# Patient Record
Sex: FEMALE | Race: WHITE | NOT HISPANIC OR LATINO | Employment: OTHER | URBAN - METROPOLITAN AREA
[De-identification: names, ages, dates, MRNs, and addresses within clinical notes are randomized per-mention and may not be internally consistent; named-entity substitution may affect disease eponyms.]

---

## 2017-10-04 ENCOUNTER — TELEPHONE (OUTPATIENT)
Dept: FAMILY MEDICINE | Facility: CLINIC | Age: 37
End: 2017-10-04

## 2017-10-04 NOTE — TELEPHONE ENCOUNTER
"Stephania Barahona is a 37 year old female who calls with concerns of scabies. Telephone call with pt and significant other, lasting greater than 40 minutes.  States that s/o went to ER twice, once here and once at [facility], and was sent home with treatment for scabies with permetherin for household both times.  States that they left cream on overnight, and washed all sheets and clothes in hot water and high heat in the dryer, vacuumed everything furniture, shoes, cars etc.  States they thought that they had gotten rid of scabies, but then symptoms returned after a week.  They are reporting seeing mites and flesh colored worms that will come white heads and turn into sores.  States that there are 10 to 15 had bugs in one sore, and they will pull them out with tweezers.  States there is a lot of pus at first when popping white heads then they see the bugs.  States that they are about the size of a poppy seed, and they will feel a bite only to see a black spot left on the skin.  States they know they shouldn't be able to see larvae, but are seeing blood/flesh colored worms coming out of white heads as well.  Reports that red fiberous material appears by wounds overnight and she's been experiencing a wormy taste in his mouth.  Pt states that she notice \"horrendous\" abdominal pain with urgency to defecate in the beginning of September shortly after s/o's ER visit.  States this has occurred 2 times in the last week, and she is not having any pain currently.  States that she has been having bloody mucus in the middle of her stools.  States stools are not diarrhea, but are a mixture of loose and formed stools.  Denies any vomiting, fever, difficulty eating or any jaundicing.  States she has noticed some hesitancy and burning with urination.  Reports excessive itching and rashing on face, hands, underarms, butt and per pt \"everywhere.\"  States they have also been using tea tree oil.  Reports everyone in the household was " "treated after pt's ER visit and they have been cleaning \"to where we clean so much that it is hard to go to work.\"  States they are washing sheets daily, bathing animals, vacuuming and showering daily--put worn clothes into plastic bags right away, and have done everything they were advised to do.  They've bug bombed their house a few times also.  They are wondering if they can get a pill or something else for treatement.  Pt is also wondering if she can just bring a stool sample in.          NURSING ASSESSMENT:  Description:  Abdominal pain, scabies  Onset/duration:  About a month  Precip. factors:  S/O diagnosed with scabies at ER  Associated symptoms:  See above  Improves/worsens symptoms:  No improvement  Pain scale (0-10)   Severe when it occurs  LMP/preg/breast feeding:  NA  Last exam/Treatment:  9/1/2016  Allergies:   Allergies   Allergen Reactions     Clindamycin/Lincomycin Nausea     Nubain [Nalbuphine Hcl] Nausea and Vomiting     And dizziness     NURSING PLAN: Huddle with provider, plan includes see below    RECOMMENDED DISPOSITION: Huddled with Dafne Feliciano CNP.  States for ongoing issues with treatment and symptoms of seeing worms, GI symptoms and etc, pt and rest of house hold needs to be seen in clinic for separate appointments instead of home treatment, and not urgent to schedule, just next available appt.  Advised if truly resistant scabies, a dermatology referral might be needed.  Relayed message to pt.  Pt scheduled for appt tomorrow with Melani Reveles PA-C.  Advised pt that provider will determine if stool samples necessary, and that a specific container with preservatives is used for collection.  Pt asks about blood flukes, advised that I am unsure about this and can discuss at appt.  Appt made for 40 minute slot with issues instead of 20 minute.  Routing to provider as FYI.  Advised pt if worsening symptoms (severe stomach pain, inability to eat/drink, etc.) that she needs to go into ER.  " Pt verbalizes understanding and agrees to plan.    Will comply with recommendation: Yes  If further questions/concerns or if symptoms do not improve, worsen or new symptoms develop, call your PCP or [facility] as soon as possible.      Guideline used: Scabies  Telephone Triage Protocols for Nurses, Fifth Edition, Marilu Rivera  Scabies Clinical References    Ian Stephens RN

## 2017-10-05 NOTE — TELEPHONE ENCOUNTER
Patient called today and states that the appointment that was made for her today 10/05 with Melani Del Real she is unable to make due to her transportation running late. She is wondering if one the of providers working in [place] tomorrow 10/6 would be able to work her into their schedule. She states she is avaiable any time of day and did not want to wait until Monday if she didn't have to. She can be reached at [phone]. Please advise.     Thank you  Loy San  Patient Representative

## 2017-10-05 NOTE — TELEPHONE ENCOUNTER
I am not able to work her in Friday for this.  She should be scheduled in the next available regular slot (this is not an acute type visit).     Electronically signed by Britney Rodriguez CNP.

## 2017-10-06 ENCOUNTER — TELEPHONE (OUTPATIENT)
Dept: FAMILY MEDICINE | Facility: OTHER | Age: 37
End: 2017-10-06

## 2017-10-06 NOTE — TELEPHONE ENCOUNTER
Telephone call back from pt, states that she has now begun to feel thinks crawling in her temple area, she is seeing worms coming out of her pores, inside of her lips and her eyes are starting to look yellowish and goopy.  Pt states all the sudden she has a bunch of worms breaking through.  Pt states that she can not wait until Monday, advised pt to go to ER.  Pt verbalizes understanding and agrees to plan.    Ian Stephens RN, BSN

## 2017-10-06 NOTE — TELEPHONE ENCOUNTER
Patient returned call and scheduling did not feel comfortable relaying message below to patient. RN advised patient she should be scheduled in the next available regular slot. First available RN could offer was Monday. Patient does not know if she wants to wait until Monday. She said she will call to see if she can get in to [place] today. She will call back if she wants to schedule for Monday.     Maria Go RN  [facility]

## 2017-10-11 ENCOUNTER — RADIANT APPOINTMENT (OUTPATIENT)
Dept: GENERAL RADIOLOGY | Facility: OTHER | Age: 37
End: 2017-10-11
Attending: NURSE PRACTITIONER

## 2017-10-11 ENCOUNTER — OFFICE VISIT (OUTPATIENT)
Dept: FAMILY MEDICINE | Facility: OTHER | Age: 37
End: 2017-10-11

## 2017-10-11 VITALS
SYSTOLIC BLOOD PRESSURE: 108 MMHG | WEIGHT: 169 LBS | OXYGEN SATURATION: 100 % | DIASTOLIC BLOOD PRESSURE: 76 MMHG | RESPIRATION RATE: 20 BRPM | BODY MASS INDEX: 28.16 KG/M2 | HEART RATE: 125 BPM | TEMPERATURE: 97.7 F | HEIGHT: 65 IN

## 2017-10-11 DIAGNOSIS — R10.84 ABDOMINAL PAIN, GENERALIZED: ICD-10-CM

## 2017-10-11 DIAGNOSIS — R19.7 DIARRHEA, UNSPECIFIED TYPE: ICD-10-CM

## 2017-10-11 DIAGNOSIS — R19.5 ABNORMAL FECES: ICD-10-CM

## 2017-10-11 DIAGNOSIS — R10.84 ABDOMINAL PAIN, GENERALIZED: Primary | ICD-10-CM

## 2017-10-11 DIAGNOSIS — R53.83 FATIGUE, UNSPECIFIED TYPE: ICD-10-CM

## 2017-10-11 LAB
ANION GAP SERPL CALCULATED.3IONS-SCNC: 22 MMOL/L (ref 3–14)
BASOPHILS # BLD AUTO: 0 10E9/L (ref 0–0.2)
BASOPHILS NFR BLD AUTO: 0.2 %
BUN SERPL-MCNC: 11 MG/DL (ref 7–30)
CALCIUM SERPL-MCNC: 9.2 MG/DL (ref 8.5–10.1)
CHLORIDE SERPL-SCNC: 101 MMOL/L (ref 94–109)
CO2 SERPL-SCNC: 28 MMOL/L (ref 20–32)
CREAT SERPL-MCNC: 0.7 MG/DL (ref 0.52–1.04)
DIFFERENTIAL METHOD BLD: NORMAL
EOSINOPHIL # BLD AUTO: 0.1 10E9/L (ref 0–0.7)
EOSINOPHIL NFR BLD AUTO: 1.5 %
ERYTHROCYTE [DISTWIDTH] IN BLOOD BY AUTOMATED COUNT: 12.9 % (ref 10–15)
GFR SERPL CREATININE-BSD FRML MDRD: >90 ML/MIN/1.7M2
GLUCOSE SERPL-MCNC: 68 MG/DL (ref 70–99)
HCT VFR BLD AUTO: 43.6 % (ref 35–47)
HGB BLD-MCNC: 14.7 G/DL (ref 11.7–15.7)
LYMPHOCYTES # BLD AUTO: 2.5 10E9/L (ref 0.8–5.3)
LYMPHOCYTES NFR BLD AUTO: 31.4 %
MCH RBC QN AUTO: 29.6 PG (ref 26.5–33)
MCHC RBC AUTO-ENTMCNC: 33.7 G/DL (ref 31.5–36.5)
MCV RBC AUTO: 88 FL (ref 78–100)
MONOCYTES # BLD AUTO: 0.8 10E9/L (ref 0–1.3)
MONOCYTES NFR BLD AUTO: 9.7 %
NEUTROPHILS # BLD AUTO: 4.6 10E9/L (ref 1.6–8.3)
NEUTROPHILS NFR BLD AUTO: 57.2 %
PLATELET # BLD AUTO: 306 10E9/L (ref 150–450)
POTASSIUM SERPL-SCNC: 4 MMOL/L (ref 3.4–5.3)
RBC # BLD AUTO: 4.96 10E12/L (ref 3.8–5.2)
SODIUM SERPL-SCNC: 151 MMOL/L (ref 133–144)
TSH SERPL DL<=0.005 MIU/L-ACNC: 1.48 MU/L (ref 0.4–4)
WBC # BLD AUTO: 8.1 10E9/L (ref 4–11)

## 2017-10-11 PROCEDURE — 74000 XR ABDOMEN 1 VW: CPT

## 2017-10-11 PROCEDURE — 80048 BASIC METABOLIC PNL TOTAL CA: CPT | Performed by: NURSE PRACTITIONER

## 2017-10-11 PROCEDURE — 99214 OFFICE O/P EST MOD 30 MIN: CPT | Performed by: NURSE PRACTITIONER

## 2017-10-11 PROCEDURE — 36415 COLL VENOUS BLD VENIPUNCTURE: CPT | Performed by: NURSE PRACTITIONER

## 2017-10-11 PROCEDURE — 85025 COMPLETE CBC W/AUTO DIFF WBC: CPT | Performed by: NURSE PRACTITIONER

## 2017-10-11 PROCEDURE — 84443 ASSAY THYROID STIM HORMONE: CPT | Performed by: NURSE PRACTITIONER

## 2017-10-11 NOTE — NURSING NOTE
"Chief Complaint   Patient presents with     Abdominal Pain       Initial /76  Pulse 125  Temp 97.7  F (36.5  C) (Tympanic)  Resp 20  Ht 5' 4.5\" (1.638 m)  Wt 169 lb (76.7 kg)  LMP  (LMP Unknown)  SpO2 100%  Breastfeeding? No  BMI 28.56 kg/m2 Estimated body mass index is 28.56 kg/(m^2) as calculated from the following:    Height as of this encounter: 5' 4.5\" (1.638 m).    Weight as of this encounter: 169 lb (76.7 kg).  Medication Reconciliation: complete   ................Chase Cool LPN,   October 11, 2017,      3:01 PM,   [facility]    "

## 2017-10-11 NOTE — PATIENT INSTRUCTIONS
I did not see any abnormalities on your x-ray.  The radiologist will review it as well and they will call if they see anything I did not see.     Bring back the stool samples.     Labs will be done today.  I will call or send a letter with results within the next 1-2 weeks.

## 2017-10-11 NOTE — LETTER
October 12, 2017      Stephania Barahona  [address] [address] [place] [id number]        Dear ,    We are writing to inform you of your test results.    So far your labs are normal, except it appears you are slightly dehydrated.  You should increase your water intake and we will let you know when we get the stool sample results back.     Resulted Orders   CBC with platelets and differential   Result Value Ref Range    WBC 8.1 4.0 - 11.0 10e9/L    RBC Count 4.96 3.8 - 5.2 10e12/L    Hemoglobin 14.7 11.7 - 15.7 g/dL    Hematocrit 43.6 35.0 - 47.0 %    MCV 88 78 - 100 fl    MCH 29.6 26.5 - 33.0 pg    MCHC 33.7 31.5 - 36.5 g/dL    RDW 12.9 10.0 - 15.0 %    Platelet Count 306 150 - 450 10e9/L    Diff Method Automated Method     % Neutrophils 57.2 %    % Lymphocytes 31.4 %    % Monocytes 9.7 %    % Eosinophils 1.5 %    % Basophils 0.2 %    Absolute Neutrophil 4.6 1.6 - 8.3 10e9/L    Absolute Lymphocytes 2.5 0.8 - 5.3 10e9/L    Absolute Monocytes 0.8 0.0 - 1.3 10e9/L    Absolute Eosinophils 0.1 0.0 - 0.7 10e9/L    Absolute Basophils 0.0 0.0 - 0.2 10e9/L   Basic metabolic panel  (Ca, Cl, CO2, Creat, Gluc, K, Na, BUN)   Result Value Ref Range    Sodium 151 (H) 133 - 144 mmol/L    Potassium 4.0 3.4 - 5.3 mmol/L    Chloride 101 94 - 109 mmol/L    Carbon Dioxide 28 20 - 32 mmol/L    Anion Gap 22 (H) 3 - 14 mmol/L    Glucose 68 (L) 70 - 99 mg/dL    Urea Nitrogen 11 7 - 30 mg/dL    Creatinine 0.70 0.52 - 1.04 mg/dL    GFR Estimate >90 >60 mL/min/1.7m2      Comment:      Non  GFR Calc    GFR Estimate If Black >90 >60 mL/min/1.7m2      Comment:       GFR Calc    Calcium 9.2 8.5 - 10.1 mg/dL   TSH with free T4 reflex   Result Value Ref Range    TSH 1.48 0.40 - 4.00 mU/L       If you have any questions or concerns, please call the clinic at the number listed above.       Sincerely,    LINDA Young CNP

## 2017-10-11 NOTE — PROGRESS NOTES
"  SUBJECTIVE:   Stephania Barahona is a 37 year old female who presents to clinic today for the following health issues:      Abdominal pain      Duration: 6 weeks only when needing to have a BM    Description (location/character/radiation): extreme pain, blood tinged, color change    Intensity:  severe    Accompanying signs and symptoms: thinks she sees bugs in her skin, on water in toilet    History (similar episodes/previous evaluation):  dx with scabies    Precipitating or alleviating factors: None    Therapies tried and outcome: Permethrin cream for scabies - treated x 2.      Stephania has several concerns today including: intermittent abdominal pain x 6-8 weeks.  Intermittent constipation and diarrhea, fatigue and possible parasite infection.  She states she has only been having bowel movements every 1-2 weeks or so for the past 8 weeks.  When she does have them, they are diarrhea typically.  Stools were normal prior to 8 weeks ago.  She is also worried about possible worms or parasites in the stool as it appeared to be \"moving\" at one point.  She also reports blood and mucous in the stools.       Her  developed a rash about 2 months ago.  He was seen and treated for scabies.  Stephania also was treated at this time (she was not seen) and they cleaned their entire house.  Her husbands rash came back and they repeated treatment for the entire family.  Stephania did have a rash and itching then as well.  She states this is improved, but she has had several episodes in the past month where she thought \"bugs\" or \"worms\" may be coming out of her skin and lips (her  had similar symptoms).  She states that has not happened in the past 2 weeks.  This is the first time she is being seen for this.  Her  has been seen a total of 3 times and was diagnosed with scabies twice and no diagnosis once.  Stephania has not had any testing done.    She has also been very fatigued, to the point of sleeping most of the day.   No " fevers, has had chills.   No nausea or vomiting  No upper respiratory infection symptoms.       Problem list and histories reviewed & adjusted, as indicated.  Additional history: none    Patient Active Problem List   Diagnosis     Closed fracture of lumbar vertebra (H)     Degeneration of lumbar or lumbosacral intervertebral disc     Female infertility     CARDIOVASCULAR SCREENING; LDL GOAL LESS THAN 160     Hypertriglyceridemia     Past Surgical History:   Procedure Laterality Date     C NONSPECIFIC PROCEDURE  2007    lumbar fracture     D & C  03/25/11     TONSILLECTOMY  1991       Social History   Substance Use Topics     Smoking status: Current Every Day Smoker     Smokeless tobacco: Never Used      Comment: 2004     Alcohol use No      Comment: rarely     Family History   Problem Relation Age of Onset     Asthma Sister      DIABETES Sister      gestational diabetes     Asthma Sister      Asthma Brother      Psychotic Disorder Maternal Grandfather      Depression Maternal Grandfather      Alcohol/Drug Maternal Grandfather      Alzheimer Disease Paternal Grandfather      Dementia         Current Outpatient Prescriptions   Medication Sig Dispense Refill     cyclobenzaprine (FLEXERIL) 10 MG tablet TAKE 1/2 - 1 TABLET EVERY NIGHT AT BEDTIME 30 tablet 2     Allergies   Allergen Reactions     Clindamycin/Lincomycin Nausea     Nubain [Nalbuphine Hcl] Nausea and Vomiting     And dizziness     BP Readings from Last 3 Encounters:   10/11/17 108/76   09/02/16 100/68   09/14/15 102/80    Wt Readings from Last 3 Encounters:   10/11/17 169 lb (76.7 kg)   09/02/16 205 lb (93 kg)   09/14/15 179 lb 14.4 oz (81.6 kg)             Reviewed and updated as needed this visit by clinical staffTobacco  Allergies  Meds  Med Hx  Surg Hx  Fam Hx  Soc Hx      Reviewed and updated as needed this visit by Provider         ROS:  CONSTITUTIONAL:POSITIVE  for chills and fatigue and NEGATIVE  for fever   INTEGUMENTARY/SKIN: NEGATIVE for  "worrisome rashes, moles or lesions, as above  E/M: NEGATIVE for ear, mouth and throat problems  R: NEGATIVE for significant cough or SOB  CV: NEGATIVE for chest pain, palpitations or peripheral edema  GI: as above     OBJECTIVE:     /76  Pulse 125  Temp 97.7  F (36.5  C) (Tympanic)  Resp 20  Ht 5' 4.5\" (1.638 m)  Wt 169 lb (76.7 kg)  LMP  (LMP Unknown)  SpO2 100%  Breastfeeding? No  BMI 28.56 kg/m2  Body mass index is 28.56 kg/(m^2).  GENERAL: healthy, alert and no distress  NECK: no adenopathy, no asymmetry, masses, or scars and thyroid normal to palpation  RESP: lungs clear to auscultation - no rales, rhonchi or wheezes  CV: regular rate and rhythm, normal S1 S2, no S3 or S4, no murmur, click or rub, no peripheral edema and peripheral pulses strong  ABDOMEN: soft, nontender, no hepatosplenomegaly, no masses and bowel sounds normal  MS: no gross musculoskeletal defects noted, no edema  SKIN: no rash, she has many healing skin lesions on her arms and legs, no drainage     Diagnostic Test Results:  Labs pending    X-ray abdomen showed moderate stool, but no obstruction    ASSESSMENT/PLAN:         1. Abdominal pain, generalized  Will check some labs and stool samples  - CBC with platelets and differential  - Basic metabolic panel  (Ca, Cl, CO2, Creat, Gluc, K, Na, BUN)  - XR Abdomen 1 View; Future    2. Diarrhea, unspecified type  As above   - Enteric Bacteria and Virus Panel by PINKY Stool; Future  - Ova and Parasite Exam Routine; Future  - CBC with platelets and differential  - Basic metabolic panel  (Ca, Cl, CO2, Creat, Gluc, K, Na, BUN)  - Giardia antigen; Future  - Fecal colorectal cancer screen (FIT); Future  - Clostridium difficile Toxin B PCR; Future  - TSH with free T4 reflex    3. Abnormal feces  As above   - Enteric Bacteria and Virus Panel by PINKY Stool; Future  - Ova and Parasite Exam Routine; Future  - Giardia antigen; Future  - Fecal colorectal cancer screen (FIT); Future    4. Fatigue, " unspecified type  As above   - TSH with free T4 reflex    See Patient Instructions  Follow up will depend on results.     LINDA Young [facility]

## 2017-10-11 NOTE — MR AVS SNAPSHOT
After Visit Summary   10/11/2017    Stephania Barahona    MRN: [id number]           Patient Information     Date Of Birth          1980        Visit Information        Provider Department      10/11/2017 2:40 PM Britney Rodriguez APRN CNP [facility]        Today's Diagnoses     Abdominal pain, generalized    -  1    Diarrhea, unspecified type        Abnormal feces        Fatigue, unspecified type          Care Instructions    I did not see any abnormalities on your x-ray.  The radiologist will review it as well and they will call if they see anything I did not see.     Bring back the stool samples.     Labs will be done today.  I will call or send a letter with results within the next 1-2 weeks.              Follow-ups after your visit        Future tests that were ordered for you today     Open Future Orders        Priority Expected Expires Ordered    Enteric Bacteria and Virus Panel by PINKY Stool Routine  10/11/2018 10/11/2017    Ova and Parasite Exam Routine Routine  10/11/2018 10/11/2017    Giardia antigen Routine  10/11/2018 10/11/2017    Fecal colorectal cancer screen (FIT) Routine 11/1/2017 1/3/2018 10/11/2017    Clostridium difficile Toxin B PCR Routine  11/10/2017 10/11/2017            Who to contact     If you have questions or need follow up information about today's clinic visit or your schedule please contact [facility] directly at [phone].  Normal or non-critical lab and imaging results will be communicated to you by MyChart, letter or phone within 4 business days after the clinic has received the results. If you do not hear from us within 7 days, please contact the clinic through MyChart or phone. If you have a critical or abnormal lab result, we will notify you by phone as soon as possible.  Submit refill requests through [employer] or call your pharmacy and they will forward the refill request to us. Please allow 3 business days for your refill to be  "completed.          Additional Information About Your Visit        [employer] Information     [employer] lets you send messages to your doctor, view your test results, renew your prescriptions, schedule appointments and more. To sign up, go to [email or web address] . Click on \"Log in\" on the left side of the screen, which will take you to the Welcome page. Then click on \"Sign up Now\" on the right side of the page.     You will be asked to enter the access code listed below, as well as some personal information. Please follow the directions to create your username and password.     Your access code is: [email or web address]  Expires: 2018 12:28 PM     Your access code will  in 90 days. If you need help or a new code, please call your [facility] clinic or [phone].        Care EveryWhere ID     This is your Care EveryWhere ID. This could be used by other organizations to access your [facility] medical records  [id number]        Your Vitals Were     Pulse Temperature Respirations Height Last Period Pulse Oximetry    125 97.7  F (36.5  C) (Tympanic) 20 5' 4.5\" (1.638 m) (LMP Unknown) 100%    Breastfeeding? BMI (Body Mass Index)                No 28.56 kg/m2           Blood Pressure from Last 3 Encounters:   10/11/17 108/76   16 100/68   09/14/15 102/80    Weight from Last 3 Encounters:   10/11/17 169 lb (76.7 kg)   16 205 lb (93 kg)   09/14/15 179 lb 14.4 oz (81.6 kg)              We Performed the Following     Basic metabolic panel  (Ca, Cl, CO2, Creat, Gluc, K, Na, BUN)     CBC with platelets and differential     TSH with free T4 reflex        Primary Care Provider    None Specified       No primary provider on file.        Equal Access to Services     SAMPSON MORALES : Marc Wolf, hermann robles, sailaja dos santos. [facility] [phone].    ATENCIÓN: Si habla español, tiene a jamison disposición servicios gratuitos de asistencia lingüística. " Alis garcia [phone].    We comply with applicable federal civil rights laws and [place] laws. We do not discriminate on the basis of race, color, national origin, age, disability, sex, sexual orientation, or gender identity.            Thank you!     Thank you for choosing [facility]  for your care. Our goal is always to provide you with excellent care. Hearing back from our patients is one way we can continue to improve our services. Please take a few minutes to complete the written survey that you may receive in the mail after your visit with us. Thank you!             Your Updated Medication List - Protect others around you: Learn how to safely use, store and throw away your medicines at www.disposemymeds.org.          This list is accurate as of: 10/11/17  3:48 PM.  Always use your most recent med list.                   Brand Name Dispense Instructions for use Diagnosis    cyclobenzaprine 10 MG tablet    FLEXERIL    30 tablet    TAKE 1/2 - 1 TABLET EVERY NIGHT AT BEDTIME    Degeneration of lumbar or lumbosacral intervertebral disc

## 2017-10-12 DIAGNOSIS — R19.7 DIARRHEA, UNSPECIFIED TYPE: ICD-10-CM

## 2017-10-12 DIAGNOSIS — R19.5 ABNORMAL FECES: ICD-10-CM

## 2017-10-12 LAB
C DIFF TOX B STL QL: ABNORMAL
SPECIMEN SOURCE: ABNORMAL

## 2017-10-12 PROCEDURE — 87506 IADNA-DNA/RNA PROBE TQ 6-11: CPT | Performed by: NURSE PRACTITIONER

## 2017-10-12 PROCEDURE — 87493 C DIFF AMPLIFIED PROBE: CPT | Performed by: NURSE PRACTITIONER

## 2017-10-12 PROCEDURE — 87177 OVA AND PARASITES SMEARS: CPT | Performed by: NURSE PRACTITIONER

## 2017-10-12 PROCEDURE — 87329 GIARDIA AG IA: CPT | Performed by: NURSE PRACTITIONER

## 2017-10-12 PROCEDURE — 87209 SMEAR COMPLEX STAIN: CPT | Performed by: NURSE PRACTITIONER

## 2017-10-12 PROCEDURE — 82274 ASSAY TEST FOR BLOOD FECAL: CPT | Performed by: NURSE PRACTITIONER

## 2017-10-13 LAB
C COLI+JEJUNI+LARI FUSA STL QL NAA+PROBE: NOT DETECTED
EC STX1 GENE STL QL NAA+PROBE: NOT DETECTED
EC STX2 GENE STL QL NAA+PROBE: NOT DETECTED
ENTERIC PATHOGEN COMMENT: NORMAL
G LAMBLIA AG STL QL IA: NORMAL
NOROV GI+II ORF1-ORF2 JNC STL QL NAA+PR: NOT DETECTED
O+P STL MICRO: NORMAL
O+P STL MICRO: NORMAL
RVA NSP5 STL QL NAA+PROBE: NOT DETECTED
SALMONELLA SP RPOD STL QL NAA+PROBE: NOT DETECTED
SHIGELLA SP+EIEC IPAH STL QL NAA+PROBE: NOT DETECTED
SPECIMEN SOURCE: NORMAL
SPECIMEN SOURCE: NORMAL
V CHOL+PARA RFBL+TRKH+TNAA STL QL NAA+PR: NOT DETECTED
Y ENTERO RECN STL QL NAA+PROBE: NOT DETECTED

## 2017-10-15 LAB — HEMOCCULT STL QL IA: POSITIVE

## 2017-10-16 DIAGNOSIS — R19.5 ABNORMAL FECES: ICD-10-CM

## 2017-10-16 DIAGNOSIS — R19.7 DIARRHEA, UNSPECIFIED TYPE: ICD-10-CM

## 2017-10-16 DIAGNOSIS — R19.5 POSITIVE FIT (FECAL IMMUNOCHEMICAL TEST): Primary | ICD-10-CM

## 2017-10-18 ENCOUNTER — TELEPHONE (OUTPATIENT)
Dept: FAMILY MEDICINE | Facility: OTHER | Age: 37
End: 2017-10-18

## 2017-10-18 NOTE — TELEPHONE ENCOUNTER
Called to schedule colonoscopy. Patient states she does not want this procedure at this time.  Informed her to call us if she changes her mind.

## 2017-10-18 NOTE — TELEPHONE ENCOUNTER
Reason for Call:  Request for results:    Name of test or procedure: FIT    Date of test of procedure: last week    Location of the test or procedure: [facility] to leave the result message on voice mail or with a family member? Please call  at [phone] to give results.  Permission given verbally by patient.     Phone number Patient can be reached at:  Home number on file [phone] (home)    Additional comments: please call.  She states daughter is now having similar symptoms.    Call taken on 10/18/2017 at 7:26 AM by Christopher Macdonald

## 2017-10-18 NOTE — TELEPHONE ENCOUNTER
I called pt, left detailed msg with instructions to set up a colonoscopy per lab results from Britney Rodriguez APRN CNP as follows:    Notes Recorded by Britney Rodriguez APRN CNP on 10/16/2017 at 7:03 AM  Please call and advise patient that her FIT test was positive for blood in her stool.  She will need to have a colonoscopy to evaluate where the blood is coming from.  Order placed, please assist in scheduling.     ................Chase Cool LPN,   October 18, 2017,      11:57 AM,   [facility]

## 2017-10-18 NOTE — PROGRESS NOTES
Called pt, left msg informing of results/recommendations.  ................Chase Cool LPN,   October 18, 2017,      12:06 PM,   [facility]

## 2017-11-09 ENCOUNTER — HOSPITAL ENCOUNTER (EMERGENCY)
Facility: CLINIC | Age: 37
Discharge: HOME | End: 2017-11-09
Attending: PHYSICIAN ASSISTANT | Admitting: PHYSICIAN ASSISTANT

## 2017-11-09 ENCOUNTER — APPOINTMENT (OUTPATIENT)
Dept: CT IMAGING | Facility: CLINIC | Age: 37
End: 2017-11-09
Attending: PHYSICIAN ASSISTANT

## 2017-11-09 ENCOUNTER — APPOINTMENT (OUTPATIENT)
Dept: ULTRASOUND IMAGING | Facility: CLINIC | Age: 37
End: 2017-11-09
Attending: PHYSICIAN ASSISTANT

## 2017-11-09 VITALS
BODY MASS INDEX: 27.88 KG/M2 | TEMPERATURE: 98.1 F | HEART RATE: 90 BPM | DIASTOLIC BLOOD PRESSURE: 69 MMHG | WEIGHT: 165 LBS | RESPIRATION RATE: 16 BRPM | SYSTOLIC BLOOD PRESSURE: 105 MMHG | OXYGEN SATURATION: 100 %

## 2017-11-09 DIAGNOSIS — K92.1 BLOOD IN STOOL: ICD-10-CM

## 2017-11-09 DIAGNOSIS — N83.201 CYSTS OF BOTH OVARIES: ICD-10-CM

## 2017-11-09 DIAGNOSIS — N83.202 CYSTS OF BOTH OVARIES: ICD-10-CM

## 2017-11-09 DIAGNOSIS — R10.30 LOWER ABDOMINAL PAIN: ICD-10-CM

## 2017-11-09 LAB
ABO + RH BLD: NORMAL
ABO + RH BLD: NORMAL
ALBUMIN SERPL-MCNC: 3.8 G/DL (ref 3.4–5)
ALBUMIN UR-MCNC: NEGATIVE MG/DL
ALP SERPL-CCNC: 80 U/L (ref 40–150)
ALT SERPL W P-5'-P-CCNC: 21 U/L (ref 0–50)
AMORPH CRY #/AREA URNS HPF: ABNORMAL /HPF
ANION GAP SERPL CALCULATED.3IONS-SCNC: 4 MMOL/L (ref 3–14)
APPEARANCE UR: ABNORMAL
AST SERPL W P-5'-P-CCNC: 19 U/L (ref 0–45)
BACTERIA #/AREA URNS HPF: ABNORMAL /HPF
BASOPHILS # BLD AUTO: 0.1 10E9/L (ref 0–0.2)
BASOPHILS NFR BLD AUTO: 0.7 %
BILIRUB SERPL-MCNC: 0.1 MG/DL (ref 0.2–1.3)
BILIRUB UR QL STRIP: NEGATIVE
BLD GP AB SCN SERPL QL: NORMAL
BLOOD BANK CMNT PATIENT-IMP: NORMAL
BUN SERPL-MCNC: 10 MG/DL (ref 7–30)
C DIFF TOX B STL QL: ABNORMAL
CALCIUM SERPL-MCNC: 8.6 MG/DL (ref 8.5–10.1)
CHLORIDE SERPL-SCNC: 105 MMOL/L (ref 94–109)
CO2 SERPL-SCNC: 31 MMOL/L (ref 20–32)
COLOR UR AUTO: YELLOW
CREAT SERPL-MCNC: 0.72 MG/DL (ref 0.52–1.04)
CRP SERPL-MCNC: <2.9 MG/L (ref 0–8)
DIFFERENTIAL METHOD BLD: NORMAL
EOSINOPHIL # BLD AUTO: 0.1 10E9/L (ref 0–0.7)
EOSINOPHIL NFR BLD AUTO: 1.5 %
ERYTHROCYTE [DISTWIDTH] IN BLOOD BY AUTOMATED COUNT: 13 % (ref 10–15)
GFR SERPL CREATININE-BSD FRML MDRD: >90 ML/MIN/1.7M2
GLUCOSE SERPL-MCNC: 115 MG/DL (ref 70–99)
GLUCOSE UR STRIP-MCNC: NEGATIVE MG/DL
HCG UR QL: NEGATIVE
HCT VFR BLD AUTO: 42.9 % (ref 35–47)
HEMOCCULT STL QL: POSITIVE
HGB BLD-MCNC: 14.3 G/DL (ref 11.7–15.7)
HGB UR QL STRIP: NEGATIVE
IMM GRANULOCYTES # BLD: 0 10E9/L (ref 0–0.4)
IMM GRANULOCYTES NFR BLD: 0.2 %
KETONES UR STRIP-MCNC: NEGATIVE MG/DL
LACTATE BLD-SCNC: 1.1 MMOL/L (ref 0.7–2)
LEUKOCYTE ESTERASE UR QL STRIP: ABNORMAL
LIPASE SERPL-CCNC: 361 U/L (ref 73–393)
LYMPHOCYTES # BLD AUTO: 2.4 10E9/L (ref 0.8–5.3)
LYMPHOCYTES NFR BLD AUTO: 27.6 %
MCH RBC QN AUTO: 29.7 PG (ref 26.5–33)
MCHC RBC AUTO-ENTMCNC: 33.3 G/DL (ref 31.5–36.5)
MCV RBC AUTO: 89 FL (ref 78–100)
MONOCYTES # BLD AUTO: 0.6 10E9/L (ref 0–1.3)
MONOCYTES NFR BLD AUTO: 6.4 %
MUCOUS THREADS #/AREA URNS LPF: PRESENT /LPF
NEUTROPHILS # BLD AUTO: 5.5 10E9/L (ref 1.6–8.3)
NEUTROPHILS NFR BLD AUTO: 63.6 %
NITRATE UR QL: NEGATIVE
PH UR STRIP: 8 PH (ref 5–7)
PLATELET # BLD AUTO: 305 10E9/L (ref 150–450)
POTASSIUM SERPL-SCNC: 3.6 MMOL/L (ref 3.4–5.3)
PROT SERPL-MCNC: 7.1 G/DL (ref 6.8–8.8)
RBC # BLD AUTO: 4.81 10E12/L (ref 3.8–5.2)
RBC #/AREA URNS AUTO: 0 /HPF (ref 0–2)
SODIUM SERPL-SCNC: 140 MMOL/L (ref 133–144)
SOURCE: ABNORMAL
SP GR UR STRIP: 1.01 (ref 1–1.03)
SPECIMEN EXP DATE BLD: NORMAL
SPECIMEN SOURCE: ABNORMAL
SQUAMOUS #/AREA URNS AUTO: 27 /HPF (ref 0–1)
UROBILINOGEN UR STRIP-MCNC: 0 MG/DL (ref 0–2)
WBC # BLD AUTO: 8.7 10E9/L (ref 4–11)
WBC #/AREA URNS AUTO: 1 /HPF (ref 0–2)

## 2017-11-09 PROCEDURE — 25000125 ZZHC RX 250: Performed by: PHYSICIAN ASSISTANT

## 2017-11-09 PROCEDURE — 81001 URINALYSIS AUTO W/SCOPE: CPT | Performed by: PHYSICIAN ASSISTANT

## 2017-11-09 PROCEDURE — 99285 EMERGENCY DEPT VISIT HI MDM: CPT | Mod: 25 | Performed by: PHYSICIAN ASSISTANT

## 2017-11-09 PROCEDURE — 86850 RBC ANTIBODY SCREEN: CPT | Performed by: PHYSICIAN ASSISTANT

## 2017-11-09 PROCEDURE — 25000128 H RX IP 250 OP 636: Performed by: PHYSICIAN ASSISTANT

## 2017-11-09 PROCEDURE — 74177 CT ABD & PELVIS W/CONTRAST: CPT

## 2017-11-09 PROCEDURE — 86901 BLOOD TYPING SEROLOGIC RH(D): CPT | Performed by: PHYSICIAN ASSISTANT

## 2017-11-09 PROCEDURE — 86140 C-REACTIVE PROTEIN: CPT | Performed by: PHYSICIAN ASSISTANT

## 2017-11-09 PROCEDURE — 83690 ASSAY OF LIPASE: CPT | Performed by: PHYSICIAN ASSISTANT

## 2017-11-09 PROCEDURE — 96360 HYDRATION IV INFUSION INIT: CPT | Performed by: PHYSICIAN ASSISTANT

## 2017-11-09 PROCEDURE — 99285 EMERGENCY DEPT VISIT HI MDM: CPT | Mod: Z6 | Performed by: PHYSICIAN ASSISTANT

## 2017-11-09 PROCEDURE — 83605 ASSAY OF LACTIC ACID: CPT | Performed by: PHYSICIAN ASSISTANT

## 2017-11-09 PROCEDURE — 82272 OCCULT BLD FECES 1-3 TESTS: CPT | Performed by: PHYSICIAN ASSISTANT

## 2017-11-09 PROCEDURE — 93976 VASCULAR STUDY: CPT | Mod: XS

## 2017-11-09 PROCEDURE — 86900 BLOOD TYPING SEROLOGIC ABO: CPT | Performed by: PHYSICIAN ASSISTANT

## 2017-11-09 PROCEDURE — 81025 URINE PREGNANCY TEST: CPT | Performed by: PHYSICIAN ASSISTANT

## 2017-11-09 PROCEDURE — 80053 COMPREHEN METABOLIC PANEL: CPT | Performed by: PHYSICIAN ASSISTANT

## 2017-11-09 PROCEDURE — 87506 IADNA-DNA/RNA PROBE TQ 6-11: CPT | Performed by: PHYSICIAN ASSISTANT

## 2017-11-09 PROCEDURE — 87086 URINE CULTURE/COLONY COUNT: CPT | Performed by: PHYSICIAN ASSISTANT

## 2017-11-09 PROCEDURE — 85025 COMPLETE CBC W/AUTO DIFF WBC: CPT | Performed by: PHYSICIAN ASSISTANT

## 2017-11-09 RX ORDER — HYDROCODONE BITARTRATE AND ACETAMINOPHEN 5; 325 MG/1; MG/1
1 TABLET ORAL EVERY 6 HOURS PRN
Qty: 6 TABLET | Refills: 0 | Status: SHIPPED | OUTPATIENT
Start: 2017-11-09 | End: 2018-07-12

## 2017-11-09 RX ORDER — IOPAMIDOL 755 MG/ML
500 INJECTION, SOLUTION INTRAVASCULAR ONCE
Status: COMPLETED | OUTPATIENT
Start: 2017-11-09 | End: 2017-11-09

## 2017-11-09 RX ORDER — SODIUM CHLORIDE 9 MG/ML
1000 INJECTION, SOLUTION INTRAVENOUS CONTINUOUS
Status: DISCONTINUED | OUTPATIENT
Start: 2017-11-09 | End: 2017-11-09 | Stop reason: HOSPADM

## 2017-11-09 RX ADMIN — SODIUM CHLORIDE 60 ML: 9 INJECTION, SOLUTION INTRAVENOUS at 17:35

## 2017-11-09 RX ADMIN — IOPAMIDOL 81 ML: 755 INJECTION, SOLUTION INTRAVENOUS at 17:35

## 2017-11-09 RX ADMIN — SODIUM CHLORIDE 1000 ML: 9 INJECTION, SOLUTION INTRAVENOUS at 17:02

## 2017-11-09 ASSESSMENT — ENCOUNTER SYMPTOMS
BLOOD IN STOOL: 1
NAUSEA: 0
ABDOMINAL PAIN: 1
RECTAL PAIN: 1
VOMITING: 0
DIARRHEA: 0
WEAKNESS: 1
FEVER: 0

## 2017-11-09 NOTE — ED NOTES
Pt c/o abd pain and bloody stool x 2-3 hours.  States she has had bloody stool and was told to get a colonoscopy,  symptoms resolved and she didn't get one.

## 2017-11-09 NOTE — ED AVS SNAPSHOT
[facility] Emergency Department    [address] [place] [address]    Phone:  [phone]    Fax:  [phone]                                       Stephania Barahona   MRN: [id number]    Department:  [facility] Emergency Department   Date of Visit:  11/9/2017           After Visit Summary Signature Page     I have received my discharge instructions, and my questions have been answered. I have discussed any challenges I see with this plan with the nurse or doctor.    ..........................................................................................................................................  Patient/Patient Representative Signature      ..........................................................................................................................................  Patient Representative Print Name and Relationship to Patient    ..................................................               ................................................  Date                                            Time    ..........................................................................................................................................  Reviewed by Signature/Title    ...................................................              ..............................................  Date                                                            Time

## 2017-11-09 NOTE — ED PROVIDER NOTES
"  History     Chief Complaint   Patient presents with     Abdominal Pain     Rectal Bleeding     The history is provided by the patient.     Stephania Barahona is a 37 year old female who presents to the ED complaining of abdominal pain and hematochezia. The patient was initially seen in the clinic on 10/11 complaining of abdominal pain with bowel movements, infrequent bowel movements, and blood in her stool for 8 weeks. An abdominal X-ray was done which showed a moderate amount of stool without obstruction. Labs were collected at that time including CBC, BMP, and TSH which were WNL. Stool collected. Ova and Parasite Screen, Giardia, C. Diff, and Enteric Bacteria panel were negative. FIT test was positive for blood. The patient was notified and told to set up a colonoscopy but she declined. Today, she presents to the ED complaining of RUQ and generalized lower abdominal pain that is intermittent, with associated rectal and vaginal pain. She awoke with it this morning but then it resolved. About 2 hours later it reoccurred, accompanied by 2-3 episodes of hematochezia. She describes this as \"blood streaked stool\". She rates her pain 5-6/10 and reports that it is increasingly painful to sit. She also complains of weakness, but thinks this may be due to a recent URI. Patient denies any fever, nausea, vomiting, diarrhea, or other associated symptoms. No vaginal discharge or bleeding.      Problem List:    Patient Active Problem List    Diagnosis Date Noted     Hypertriglyceridemia 01/27/2015     Priority: Medium     CARDIOVASCULAR SCREENING; LDL GOAL LESS THAN 160 10/31/2010     Priority: Medium     Female infertility 09/30/2009     Priority: Medium     Problem list name updated by automated process. Provider to review       Closed fracture of lumbar vertebra (H) 12/24/2007     Priority: Medium     Problem list name updated by automated process. Provider to review       Degeneration of lumbar or lumbosacral intervertebral " disc 12/24/2007     Priority: Medium        Past Medical History:    Past Medical History:   Diagnosis Date     NO ACTIVE PROBLEMS        Past Surgical History:    Past Surgical History:   Procedure Laterality Date     C NONSPECIFIC PROCEDURE  2007    lumbar fracture     D & C  03/25/11     TONSILLECTOMY  1991       Family History:    Family History   Problem Relation Age of Onset     Asthma Sister      DIABETES Sister      gestational diabetes     Asthma Sister      Asthma Brother      Psychotic Disorder Maternal Grandfather      Depression Maternal Grandfather      Alcohol/Drug Maternal Grandfather      Alzheimer Disease Paternal Grandfather      Dementia       Social History:  Marital Status:   [2]  Social History   Substance Use Topics     Smoking status: Current Every Day Smoker     Smokeless tobacco: Never Used      Comment: 2004     Alcohol use No      Comment: rarely        Medications:      HYDROcodone-acetaminophen (NORCO) 5-325 MG per tablet   cyclobenzaprine (FLEXERIL) 10 MG tablet         Review of Systems   Constitutional: Negative for fever.   Gastrointestinal: Positive for abdominal pain (RUQ and lower abdomen), blood in stool (bright red streaks) and rectal pain. Negative for diarrhea, nausea and vomiting.   Neurological: Positive for weakness.   All other systems reviewed and are negative.      Physical Exam   BP: 120/80  Pulse: 108  Heart Rate: 90  Temp: 96.7  F (35.9  C)  Resp: 16  Weight: 74.8 kg (165 lb)  SpO2: 98 %      Physical Exam   Constitutional: She is oriented to person, place, and time. No distress.   HENT:   Head: Normocephalic and atraumatic.   Mouth/Throat: Oropharynx is clear and moist.   Eyes: Conjunctivae and EOM are normal.   Neck: Normal range of motion. Neck supple.   Cardiovascular: Normal rate, regular rhythm, normal heart sounds and intact distal pulses.    No murmur heard.  Pulmonary/Chest: Effort normal and breath sounds normal. No respiratory distress. She has no  wheezes. She has no rales. She exhibits no tenderness.   Abdominal: Normal appearance and bowel sounds are normal. She exhibits no distension. There is tenderness in the right lower quadrant, suprapubic area and left lower quadrant. There is no rigidity, no rebound and no guarding.       Musculoskeletal: Normal range of motion.   Neurological: She is alert and oriented to person, place, and time.   Skin: Skin is warm and dry. No rash noted. She is not diaphoretic. No pallor.   Psychiatric: She has a normal mood and affect. Her behavior is normal.   Nursing note and vitals reviewed.      ED Course     ED Course     Procedures       Critical Care time:  none     Results for orders placed or performed during the hospital encounter of 11/09/17 (from the past 24 hour(s))   UA with Microscopic reflex to Culture   Result Value Ref Range    Color Urine Yellow     Appearance Urine Cloudy     Glucose Urine Negative NEG^Negative mg/dL    Bilirubin Urine Negative NEG^Negative    Ketones Urine Negative NEG^Negative mg/dL    Specific Gravity Urine 1.014 1.003 - 1.035    Blood Urine Negative NEG^Negative    pH Urine 8.0 (H) 5.0 - 7.0 pH    Protein Albumin Urine Negative NEG^Negative mg/dL    Urobilinogen mg/dL 0.0 0.0 - 2.0 mg/dL    Nitrite Urine Negative NEG^Negative    Leukocyte Esterase Urine Large (A) NEG^Negative    Source Unspecified Urine     WBC Urine 1 0 - 2 /HPF    RBC Urine 0 0 - 2 /HPF    Bacteria Urine Few (A) NEG^Negative /HPF    Squamous Epithelial /HPF Urine 27 (H) 0 - 1 /HPF    Mucous Urine Present (A) NEG^Negative /LPF    Amorphous Crystals Few (A) NEG^Negative /HPF   HCG qualitative urine   Result Value Ref Range    HCG Qual Urine Negative NEG^Negative   Occult blood stool   Result Value Ref Range    Occult Blood Positive (A) NEG^Negative   Clostridium difficile toxin B PCR   Result Value Ref Range    Specimen Description Feces     C Diff Toxin B PCR Canceled, Test credited (A) NEG^Negative   CBC with platelets  differential   Result Value Ref Range    WBC 8.7 4.0 - 11.0 10e9/L    RBC Count 4.81 3.8 - 5.2 10e12/L    Hemoglobin 14.3 11.7 - 15.7 g/dL    Hematocrit 42.9 35.0 - 47.0 %    MCV 89 78 - 100 fl    MCH 29.7 26.5 - 33.0 pg    MCHC 33.3 31.5 - 36.5 g/dL    RDW 13.0 10.0 - 15.0 %    Platelet Count 305 150 - 450 10e9/L    Diff Method Automated Method     % Neutrophils 63.6 %    % Lymphocytes 27.6 %    % Monocytes 6.4 %    % Eosinophils 1.5 %    % Basophils 0.7 %    % Immature Granulocytes 0.2 %    Absolute Neutrophil 5.5 1.6 - 8.3 10e9/L    Absolute Lymphocytes 2.4 0.8 - 5.3 10e9/L    Absolute Monocytes 0.6 0.0 - 1.3 10e9/L    Absolute Eosinophils 0.1 0.0 - 0.7 10e9/L    Absolute Basophils 0.1 0.0 - 0.2 10e9/L    Abs Immature Granulocytes 0.0 0 - 0.4 10e9/L   Comprehensive metabolic panel   Result Value Ref Range    Sodium 140 133 - 144 mmol/L    Potassium 3.6 3.4 - 5.3 mmol/L    Chloride 105 94 - 109 mmol/L    Carbon Dioxide 31 20 - 32 mmol/L    Anion Gap 4 3 - 14 mmol/L    Glucose 115 (H) 70 - 99 mg/dL    Urea Nitrogen 10 7 - 30 mg/dL    Creatinine 0.72 0.52 - 1.04 mg/dL    GFR Estimate >90 >60 mL/min/1.7m2    GFR Estimate If Black >90 >60 mL/min/1.7m2    Calcium 8.6 8.5 - 10.1 mg/dL    Bilirubin Total 0.1 (L) 0.2 - 1.3 mg/dL    Albumin 3.8 3.4 - 5.0 g/dL    Protein Total 7.1 6.8 - 8.8 g/dL    Alkaline Phosphatase 80 40 - 150 U/L    ALT 21 0 - 50 U/L    AST 19 0 - 45 U/L   Lipase   Result Value Ref Range    Lipase 361 73 - 393 U/L   Lactic acid whole blood   Result Value Ref Range    Lactic Acid 1.1 0.7 - 2.0 mmol/L   ABO/Rh type and screen   Result Value Ref Range    ABO A     RH(D) Pos     Antibody Screen Neg     Test Valid Only At [facility]        Specimen Expires 11/12/2017    CRP inflammation   Result Value Ref Range    CRP Inflammation <2.9 0.0 - 8.0 mg/L   CT Abdomen Pelvis w Contrast    Narrative    CT ABDOMEN AND PELVIS WITH CONTRAST  11/9/2017  5:45 PM     HISTORY: Lower abdominal pain,  bloody stools.     CONTRAST DOSE:  81mL Isovue-370.    Radiation dose for this scan was reduced using automated exposure  control, adjustment of the mA and/or kV according to patient size, or  iterative reconstruction technique.    FINDINGS:  Prominent fecal debris is noted throughout the colon. No  pericolonic inflammatory stranding is noted. There is a  normal-appearing appendix. No evidence of bowel obstruction. No free  peritoneal air. The liver, spleen, kidneys, adrenal glands, pancreas,  and gallbladder appear within normal limits. Prominent bilateral  ovarian cysts are noted measuring 4 cm on the left and 4.1 x 2.4 cm on  the right. There is only a small amount of free peritoneal fluid  within the cul-de-sac.      Impression    IMPRESSION:  1. Bilateral 4 cm ovarian cysts with a small amount of fluid noted  within the cul-de-sac.  2. No other CT evidence of an acute inflammatory process in the  abdomen or pelvis.   US Pelvic Complete w Transvaginal & Abd/Pel Duplex Limited    Narrative    COMPLETE PELVIS ULTRASOUND WITH TRANSVAGINAL AND DOPPLER LIMITED   11/9/2017 7:59 PM    HISTORY:  Pelvic pain.     FINDINGS: Ultrasound was performed transvaginally as well as  transabdominally in order to optimally evaluate the adnexa.    The uterus measured 9.4 x 5.5 x 4.3 cm with an endometrial thickness  of 1.4 cm. No myometrial abnormality was demonstrated. Within the  right ovary, there is a 3.9 x 2.9 x 3.0 cm cyst with low-level  internal echoes. Within the left ovary there is a 4.4 x 3.1 x 3.3 cm  cyst with low-level internal echoes. Doppler waveform analysis  demonstrates grossly normal blood flow to both ovaries. There was a  small amount of free pelvic fluid.      Impression    IMPRESSION: Right ovarian 3.9 and left ovarian 4.4 cm complex cysts,  likely hemorrhagic cysts. No sonographic evidence of torsion. Small  amount of free pelvic fluid.       Medications   0.9% sodium chloride BOLUS (0 mLs Intravenous  Stopped 11/9/17 1804)     Followed by   0.9% sodium chloride infusion (not administered)   sodium chloride 0.9 % bag 500mL for CT scan flush use (60 mLs Intravenous Given 11/9/17 1735)   iopamidol (ISOVUE-370) solution 500 mL (81 mLs Intravenous Given 11/9/17 1735)   sodium chloride (PF) 0.9% PF flush 3 mL (3 mLs Intracatheter Given 11/9/17 1733)       Assessments & Plan (with Medical Decision Making)  Stephania is a 37 year old female who presents to the ED complaining of RUQ and lower abdominal pain, rectal pain, and hematochezia intermittently for 2-3 months. She was evaluated and had a positive FIT on 10/11, but refused a colonoscopy. Patient's pulse is slightly elevated at 108. She is otherwise afebrile with normal vitals upon presentation to the ED. Exam as above. IV established and IV fluids given. Patient repeatedly declined pain medication throughout ED stay. Labs studies obtained and revealed a normal white count, stable hemoglobin of 14.3, negative CRP.  LFTs and lipase are within normal limits.  Fecal occult was positive today.  Urine collected, which did have leukocyte esterase but appeared contaminated. Abdomen/ Pelvis CT obtained, and this showed bilateral 4 cm ovarian cysts but no acute inflammatory process otherwise to explain her bloody stools or lower pelvic pain.  Ovarian cyst would explain her lower pelvic pain today.  There was concern for possible complication of this such as rupture versus ovarian torsion.  Ectopic pregnancy unlikely since UPT was negative.  An ultrasound of her pelvis confirmed 3.9 and 4.4 cm complex cysts bilaterally that were likely hemorrhagic in nature. There were no acute findings otherwise. The patient reported that she does have issues with ovarian cysts but has never thought of what to do about them.  I do think that this is likely causing some of her pain.  I do not have an explanation for her bloody stools but I think it may be related to constipation and straining  "leading to possible internal hemorrhoids.  It does not appear to be an acute colitis or inflammatory condition as her labs did not suggest this today and she is not having diarrhea.  She may have a polyp that is bleeding but regardless she does need a colonoscopy for further evaluation. Her hemoglobin is stable and blood is described as \"streaking,\" not gross blood so I think this can be evaluated further in the outpatient setting. I advised that she call and schedule tomorrow to schedule a colonoscopy.  I also gave her the number for Dr. Solo to follow-up with regarding her ovarian cysts. In the meantime I encouraged her to use ibuprofen and tylenol for pain. She requested a script for breakthrough pain, so I did give her 6 tablets of norco. She was given instructions of when to return to the ED. The patient was comfortable with this plan and with discharge at this time.     I have reviewed the nursing notes.    I have reviewed the findings, diagnosis, plan and need for follow up with the patient.    Discharge Medication List as of 11/9/2017  8:25 PM      START taking these medications    Details   HYDROcodone-acetaminophen (NORCO) 5-325 MG per tablet Take 1 tablet by mouth every 6 hours as needed for moderate to severe pain, Disp-6 tablet, R-0, Local Print             Final diagnoses:   Cysts of both ovaries   Blood in stool   Lower abdominal pain     This document serves as a record of services personally performed by Elli Covarrubias PA. It was created on their behalf by Pauline Lau, a trained medical scribe. The creation of this record is based on the provider's personal observations and the statements of the patient. This document has been checked and approved by the attending provider.    Note: Chart documentation done in part with Dragon Voice Recognition software. Although reviewed after completion, some word and grammatical errors may remain.    11/9/2017   [facility] " DEPARTMENT     Satish, Elli Salas PA-C  11/09/17 8419

## 2017-11-09 NOTE — ED AVS SNAPSHOT
[facility] Emergency Department    [address] [place] [address]    Phone:  [phone]    Fax:  [phone]                                       Stephania Barahona   MRN: [id number]    Department:  [facility] Emergency Department   Date of Visit:  11/9/2017           Patient Information     Date Of Birth          1980        Your diagnoses for this visit were:     Cysts of both ovaries     Blood in stool     Lower abdominal pain        You were seen by Elli Covarrubias PA-C.      Follow-up Information     Follow up with [facility] Emergency Department.    Specialty:  EMERGENCY MEDICINE    Why:  If symptoms worsen    Contact information:    [address] [place] [address] [phone]    Additional information:    From [address]: Exit at [address] on south side of [place]. Turn right on [address]. Turn left at stoplight on [address]. [facility] will be in view two blocks ahead        Call Jaiden Solo MD.    Specialties:  Family Practice, OB/Gyn    Why:  For ER follow up    Contact information:    [address] [place] [address] [phone]          Discharge Instructions       Please call in the morning and schedule follow-up with Dr. Solo, our gynecology provider.  Also please call and schedule the colonoscopy.  In the meantime, use ibuprofen or Tylenol to manage pain.  Reserve the use of Norco for severe pain at bedtime.  If you develop significantly worsening symptoms please not hesitate to return to the emergency department.    Thank you for choosing [facility]'s Emergency Department. It was a pleasure taking care of you today. If you have any questions, please call [phone].    Elli Covarrubias PA-C      Discharge References/Attachments     OVARIAN CYST (ENGLISH)    LOWER GI BLEEDING (STABLE) (ENGLISH)      24 Hour Appointment Hotline       To make an appointment at any  [facility], call [phone] ([phone]). If you don't have a family doctor or clinic, we will help you find one. [facility] clinics are conveniently located to serve the needs of you and your family.             Review of your medicines      START taking        Dose / Directions Last dose taken    HYDROcodone-acetaminophen 5-325 MG per tablet   Commonly known as:  NORCO   Dose:  1 tablet   Quantity:  6 tablet        Take 1 tablet by mouth every 6 hours as needed for moderate to severe pain   Refills:  0          Our records show that you are taking the medicines listed below. If these are incorrect, please call your family doctor or clinic.        Dose / Directions Last dose taken    cyclobenzaprine 10 MG tablet   Commonly known as:  FLEXERIL   Quantity:  30 tablet        TAKE 1/2 - 1 TABLET EVERY NIGHT AT BEDTIME   Refills:  2                Prescriptions were sent or printed at these locations (1 Prescription)                   [facility]   [address] [address]    Telephone:  [phone]   Fax:  [phone]   Hours:                  Printed at Department/Unit printer (1 of 1)         HYDROcodone-acetaminophen (NORCO) 5-325 MG per tablet                Procedures and tests performed during your visit     ABO/Rh type and screen    CBC with platelets differential    CRP inflammation    CT Abdomen Pelvis w Contrast    Clostridium difficile toxin B PCR    Comprehensive metabolic panel    Enteric Bacteria and Virus Panel by PINKY Stool    Give 20 ounces of water 15 minutes before CT of abdomen    HCG qualitative urine    Lactic acid whole blood    Lipase    Occult blood stool    Peripheral IV: Standard    UA with Microscopic reflex to Culture    US Pelvic Complete w Transvaginal & Abd/Pel Duplex Limited    Urine Culture Aerobic Bacterial      Orders Needing Specimen Collection     None      Pending Results     Date and Time Order Name Status Description     "2017 1831 US Pelvic Complete w Transvaginal & Abd/Pel Duplex Limited Preliminary     2017 1800 Enteric Bacteria and Virus Panel by PINKY Stool In process     2017 1620 CT Abdomen Pelvis w Contrast Preliminary     2017 1610 Urine Culture Aerobic Bacterial In process             Pending Culture Results     Date and Time Order Name Status Description    2017 1800 Enteric Bacteria and Virus Panel by PINKY Stool In process     2017 1610 Urine Culture Aerobic Bacterial In process             Pending Results Instructions     If you had any lab results that were not finalized at the time of your Discharge, you can call the ED Lab Result RN at [phone]. You will be contacted by this team for any positive Lab results or changes in treatment. The nurses are available 7 days a week from 10A to 6:30P.  You can leave a message 24 hours per day and they will return your call.        Thank you for choosing [facility]       Thank you for choosing [facility] for your care. Our goal is always to provide you with excellent care. Hearing back from our patients is one way we can continue to improve our services. Please take a few minutes to complete the written survey that you may receive in the mail after you visit with us. Thank you!        [employer] Information     [employer] lets you send messages to your doctor, view your test results, renew your prescriptions, schedule appointments and more. To sign up, go to [email or web address] . Click on \"Log in\" on the left side of the screen, which will take you to the Welcome page. Then click on \"Sign up Now\" on the right side of the page.     You will be asked to enter the access code listed below, as well as some personal information. Please follow the directions to create your username and password.     Your access code is: [email or web address]  Expires: 2018 11:28 AM     Your access code will  in 90 days. If you need help or a new code, please call your [facility] " [facility] or [phone].        Care EveryWhere ID     This is your Care EveryWhere ID. This could be used by other organizations to access your [facility] medical records  [id number]        Equal Access to Services     SAMPSON MORALES : Marc Wolf, wacezarda luqadaha, qaybta kaalmada jose, sailaja boykin. So [facility] [phone].    ATENCIÓN: Si habla español, tiene a jamison disposición servicios gratuitos de asistencia lingüística. Llame al [phone].    We comply with applicable federal civil rights laws and [place] laws. We do not discriminate on the basis of race, color, national origin, age, disability, sex, sexual orientation, or gender identity.            After Visit Summary       This is your record. Keep this with you and show to your community pharmacist(s) and doctor(s) at your next visit.

## 2017-11-10 LAB
BACTERIA SPEC CULT: NO GROWTH
C COLI+JEJUNI+LARI FUSA STL QL NAA+PROBE: NOT DETECTED
EC STX1 GENE STL QL NAA+PROBE: NOT DETECTED
EC STX2 GENE STL QL NAA+PROBE: NOT DETECTED
ENTERIC PATHOGEN COMMENT: NORMAL
Lab: NORMAL
NOROV GI+II ORF1-ORF2 JNC STL QL NAA+PR: NOT DETECTED
RVA NSP5 STL QL NAA+PROBE: NOT DETECTED
SALMONELLA SP RPOD STL QL NAA+PROBE: NOT DETECTED
SHIGELLA SP+EIEC IPAH STL QL NAA+PROBE: NOT DETECTED
SPECIMEN SOURCE: NORMAL
V CHOL+PARA RFBL+TRKH+TNAA STL QL NAA+PR: NOT DETECTED
Y ENTERO RECN STL QL NAA+PROBE: NOT DETECTED

## 2017-11-10 NOTE — DISCHARGE INSTRUCTIONS
Please call in the morning and schedule follow-up with Dr. Solo, our gynecology provider.  Also please call and schedule the colonoscopy.  In the meantime, use ibuprofen or Tylenol to manage pain.  Reserve the use of Norco for severe pain at bedtime.  If you develop significantly worsening symptoms please not hesitate to return to the emergency department.    Thank you for choosing [facility]'s Emergency Department. It was a pleasure taking care of you today. If you have any questions, please call [phone].    Elli Covarrubias PA-C

## 2018-06-18 ENCOUNTER — TELEPHONE (OUTPATIENT)
Dept: FAMILY MEDICINE | Facility: OTHER | Age: 38
End: 2018-06-18

## 2018-06-18 NOTE — TELEPHONE ENCOUNTER
Reason for call:  Patient reporting a symptom    Symptom or request: foot, ankle, calf swelling    Duration (how long have symptoms been present): 3 days    Have you been treated for this before? No    Additional comments: Stephania is scheduled to see Chaitanya CHOI In [place] on 6-19-18, after scheduling the appointment she requested to speak to a nurse to see if she does need to be seen.    Phone Number patient can be reached at:  Home number on file [phone] (home)    Best Time:      Can we leave a detailed message on this number:  YES    Call taken on 6/18/2018 at 2:33 PM by Luna Chew

## 2018-06-18 NOTE — TELEPHONE ENCOUNTER
Attempted to call the patient at [phone] (home), no answer. Unidentified VM so unable to leave message.     Maria Go RN  [facility]

## 2018-06-19 NOTE — TELEPHONE ENCOUNTER
Attempted to call the patient at [phone] (home), no answer. Unidentified VM so unable to leave message.    Maria Go RN  [facility]

## 2018-06-20 NOTE — TELEPHONE ENCOUNTER
Attempted to call the patient at [phone] (home), no answer. Unidentified VM so unable to leave a message.     Maria Go RN  [facility]

## 2018-06-25 ENCOUNTER — OFFICE VISIT (OUTPATIENT)
Dept: URGENT CARE | Facility: RETAIL CLINIC | Age: 38
End: 2018-06-25

## 2018-06-25 VITALS
DIASTOLIC BLOOD PRESSURE: 81 MMHG | HEART RATE: 108 BPM | OXYGEN SATURATION: 95 % | TEMPERATURE: 98.6 F | SYSTOLIC BLOOD PRESSURE: 121 MMHG

## 2018-06-25 DIAGNOSIS — M25.472 SWOLLEN ANKLES: ICD-10-CM

## 2018-06-25 DIAGNOSIS — M25.471 PAIN AND SWELLING OF ANKLE, RIGHT: ICD-10-CM

## 2018-06-25 DIAGNOSIS — L08.9 SKIN INFECTION: Primary | ICD-10-CM

## 2018-06-25 DIAGNOSIS — M25.471 SWOLLEN ANKLES: ICD-10-CM

## 2018-06-25 DIAGNOSIS — M25.571 PAIN AND SWELLING OF ANKLE, RIGHT: ICD-10-CM

## 2018-06-25 PROCEDURE — 99202 OFFICE O/P NEW SF 15 MIN: CPT | Performed by: NURSE PRACTITIONER

## 2018-06-25 RX ORDER — CEPHALEXIN 500 MG/1
500 CAPSULE ORAL 3 TIMES DAILY
Qty: 30 CAPSULE | Refills: 0 | Status: SHIPPED | OUTPATIENT
Start: 2018-06-25 | End: 2018-07-05

## 2018-06-25 NOTE — PROGRESS NOTES
[facility] clinic note    SUBJECTIVE:  Stephania Barahona is a 38 year old female who presents to [facility] with chief complaint of a rash.  Onset of rash was 1 week(s) ago.   Rash is gradual onset and worsening.  Location of the rash: feet & ankles.  Quality/symptoms of rash: itching, painful, red and swelling   Symptoms are mild and moderate and rash seems to be worsening.  Previous history of a similar rash? No  Recent exposure history: none known    Associated symptoms include: nothing.    Current Outpatient Prescriptions   Medication     cyclobenzaprine (FLEXERIL) 10 MG tablet     HYDROcodone-acetaminophen (NORCO) 5-325 MG per tablet     No current facility-administered medications for this visit.        PAST MEDICAL HISTORY:   Past Medical History:   Diagnosis Date     NO ACTIVE PROBLEMS        PAST SURGICAL HISTORY:   Past Surgical History:   Procedure Laterality Date     C NONSPECIFIC PROCEDURE  2007    lumbar fracture     D & C  03/25/11     TONSILLECTOMY  1991       FAMILY HISTORY:   Family History   Problem Relation Age of Onset     Asthma Sister      Diabetes Sister      gestational diabetes     Asthma Sister      Asthma Brother      Psychotic Disorder Maternal Grandfather      Depression Maternal Grandfather      Alcohol/Drug Maternal Grandfather      Alzheimer Disease Paternal Grandfather      Dementia       SOCIAL HISTORY:   Social History   Substance Use Topics     Smoking status: Current Every Day Smoker     Smokeless tobacco: Never Used      Comment: 2004     Alcohol use No      Comment: rarely         ROS:  Review of systems negative except as stated above.    EXAM:   Vitals:    06/25/18 1515   BP: 121/81   Pulse: 108   Temp: 98.6  F (37  C)   TempSrc: Oral   SpO2: 95%     GENERAL: alert, no acute distress.  SKIN: Rash description:    Distribution: localized  Location: feet and ankles.    Color: red and skin color,  Lesion type: macular, papular,  local ized with tenderness, swelling and inflammation  GENERAL APPEARANCE: alert, mild distress, moderate distress and cooperative  EYES: EOMI,  PERRL, conjunctiva clear  NECK: supple, non-tender to palpation, no adenopathy noted  Pulses noted in feet mildly depressed over swollen ankles    ASSESSMENT:   Skin infection  Swollen ankles  Pain and swelling of ankle, right      PLAN:  Current Outpatient Prescriptions   Medication     cephALEXin (KEFLEX) 500 MG capsule     cyclobenzaprine (FLEXERIL) 10 MG tablet     HYDROcodone-acetaminophen (NORCO) 5-325 MG per tablet     No current facility-administered medications for this visit.      Treatment of pruritus can be difficult and often frustrating. Specific treatments exist for some, but not all.  Antihistamines are the most widely utilized agents for pruritus. (such as Benadryl & Zyrtec).  Appropriate skin care is imperative.  Avoidance of scratching, and therefore secondary skin irritation and perpetuation of the itch-scratch cycle, is also important.    Avoidance of contact irritants such as wool clothing, cleansing agents, and pet dander may be helpful.    Many forms of pruritus can be worsened by dry skin and may improve with treatment for dry skin, including use of a humidifier, maintaining a cool environment, avoiding hot baths/showers, and using only mild soaps.      Lubrication options discussed.    Topical antipruritics such as a camphor-based lotion or oatmeal baths may offer temporary relief.  OTC Treatments were reviewed with Stephania Barahona  Patient informed to F/U with PCP if symptoms worsen or do not resolve.    Information on the above diagnosis was given to the patient.  Observe for signs of superimposed infection and systemic symptoms  Reassurance was given to the patient.  Watch for signs of fever or worsening of the rash.    If not improving Follow up at:  [facility] [phone]    Andrew Jasmine MSN, APRN, Family  NP-C  [facility]

## 2018-06-25 NOTE — TELEPHONE ENCOUNTER
Attempted to call the patient at [phone] (home), no answer. Left message for a return call to the clinic when available.     Maria Go RN  [facility]

## 2018-06-25 NOTE — MR AVS SNAPSHOT
"              After Visit Summary   2018    Stephania Barahona    MRN: [id number]           Patient Information     Date Of Birth          1980        Visit Information        Provider Department      2018 3:20 PM Andrew Jasmine APRN [facility]        Today's Diagnoses     Skin infection    -  1    Swollen ankles        Pain and swelling of ankle, right           Follow-ups after your visit        Who to contact     You can reach your care team any time of the day by calling [phone].  Notification of test results:  If you have an abnormal lab result, we will notify you by phone as soon as possible.         Additional Information About Your Visit        MyChart Information     [employer] lets you send messages to your doctor, view your test results, renew your prescriptions, schedule appointments and more. To sign up, go to [email or web address] . Click on \"Log in\" on the left side of the screen, which will take you to the Welcome page. Then click on \"Sign up Now\" on the right side of the page.     You will be asked to enter the access code listed below, as well as some personal information. Please follow the directions to create your username and password.     Your access code is: [email or web address]  Expires: 2018  3:49 PM     Your access code will  in 90 days. If you need help or a new code, please call your [facility] clinic or [phone].        Care EveryWhere ID     This is your [facility] EveryWhere ID. This could be used by other organizations to access your [facility] medical records  [id number]        Your Vitals Were     Pulse Temperature Pulse Oximetry             108 98.6  F (37  C) (Oral) 95%          Blood Pressure from Last 3 Encounters:   18 121/81   17 105/69   10/11/17 108/76    Weight from Last 3 Encounters:   17 165 lb (74.8 kg)   10/11/17 169 lb (76.7 kg)   16 205 lb (93 kg)              Today, you had the following     No orders " found for display         Today's Medication Changes          These changes are accurate as of 6/25/18  3:49 PM.  If you have any questions, ask your nurse or doctor.               Start taking these medicines.        Dose/Directions    cephALEXin 500 MG capsule   Commonly known as:  KEFLEX   Used for:  Skin infection   Started by:  Andrew Jasmine APRN CNP        Dose:  500 mg   Take 1 capsule (500 mg) by mouth 3 times daily for 10 days   Quantity:  30 capsule   Refills:  0            Where to get your medicines      These medications were sent to [address]  [address], [facility] [address]     Phone:  [phone]     cephALEXin 500 MG capsule                Primary Care Provider Fax #    Physician No Ref-Primary [phone]       No address on file        Equal Access to Services     SAMPSON MORALES : Marc Wolf, waaxda luqadaha, qaybta kaalmada adeegyatawanna, sailaja [place] . So [facility] [phone].    ATENCIÓN: Si habla español, tiene a jamison disposición servicios gratuitos de asistencia lingüística. Llame al [phone].    We comply with applicable federal civil rights laws and [place] laws. We do not discriminate on the basis of race, color, national origin, age, disability, sex, sexual orientation, or gender identity.            Thank you!     Thank you for choosing [facility]  for your care. Our goal is always to provide you with excellent care. Hearing back from our patients is one way we can continue to improve our services. Please take a few minutes to complete the written survey that you may receive in the mail after your visit with us. Thank you!             Your Updated Medication List - Protect others around you: Learn how to safely use, store and throw away your medicines at www.disposemymeds.org.          This list is accurate as of 6/25/18  3:49 PM.  Always use your most recent med list.                    Brand Name Dispense Instructions for use Diagnosis    cephALEXin 500 MG capsule    KEFLEX    30 capsule    Take 1 capsule (500 mg) by mouth 3 times daily for 10 days    Skin infection       cyclobenzaprine 10 MG tablet    FLEXERIL    30 tablet    TAKE 1/2 - 1 TABLET EVERY NIGHT AT BEDTIME    Degeneration of lumbar or lumbosacral intervertebral disc       HYDROcodone-acetaminophen 5-325 MG per tablet    NORCO    6 tablet    Take 1 tablet by mouth every 6 hours as needed for moderate to severe pain

## 2018-06-25 NOTE — TELEPHONE ENCOUNTER
I don't have any same day or acute slots in the next 24 hours.  She can have the next ACUTE slot I have open.     Electronically signed by Britney Rodriguez CNP.

## 2018-06-25 NOTE — TELEPHONE ENCOUNTER
Stephania Barahona is a 38 year old female who calls with swollen ankle with redness and heat.    NURSING ASSESSMENT:  Description:  Patient had an appointment scheduled on 6/19 and was unable to get a ride to the clinic.  She is still having foot swelling, but that it is better than it was last week with ice and elevation.  She states that this week the outside of her ankle is the only part still swollen and it has redness and heat to the area.  The area is the size of her palm.  Last week she had bilateral swelling in her ankles and the other leg has healed with ice and elevation.  She is also reporting that she has a few red spots on her legs that have a hard lump below the skin in the middle.  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  She would like to know what these things could be as she does not have insurance and does not want to come in if RN does not think it is necessary.  She is informed that since she has redness, heat and swelling to one ankle, she should be seen within the next day for evaluation.  Patient understands and agrees to this plan.    Onset/duration:  1.5 weeks  Precip. factors:  unknown  Associated symptoms:  See above  Improves/worsens symptoms:  improving    Last exam/Treatment:  10/2017  Allergies:   Allergies   Allergen Reactions     Clindamycin/Lincomycin Nausea     Nubain [Nalbuphine Hcl] Nausea and Vomiting     And dizziness       NURSING PLAN: Routed to provider Yes    RECOMMENDED DISPOSITION:  See in 24 hours - routing to see if we can use an acute or same day slot  Will comply with recommendation: Yes  If further questions/concerns or if symptoms do not improve, worsen or new symptoms develop, call your PCP or [facility] as soon as possible.      Guideline used:  Ankle problems - says 2-4 hours, but as she had an appointment last week that she didn't make it to and this has been going on for the past 1.5  weeks, 24 hours should be ok.  Telephone Triage Protocols for Nurses, Fifth Edition, Marilu Kaur RN

## 2018-06-26 NOTE — TELEPHONE ENCOUNTER
Attempted to call the patient at [phone] (home), no answer. Left message for a return call to the clinic when available.     Maria Go RN  [facility]

## 2018-06-27 NOTE — TELEPHONE ENCOUNTER
Patient was seen at [facility] on 6/25/18 for the symptoms listed below.     Maria Go RN  [facility]

## 2018-07-07 ENCOUNTER — HOSPITAL ENCOUNTER (EMERGENCY)
Facility: CLINIC | Age: 38
Discharge: HOME | End: 2018-07-08
Attending: FAMILY MEDICINE | Admitting: FAMILY MEDICINE

## 2018-07-07 ENCOUNTER — APPOINTMENT (OUTPATIENT)
Dept: ULTRASOUND IMAGING | Facility: CLINIC | Age: 38
End: 2018-07-07
Attending: FAMILY MEDICINE

## 2018-07-07 DIAGNOSIS — K80.20 CALCULUS OF GALLBLADDER WITHOUT CHOLECYSTITIS WITHOUT OBSTRUCTION: ICD-10-CM

## 2018-07-07 DIAGNOSIS — R10.13 ABDOMINAL PAIN, EPIGASTRIC: ICD-10-CM

## 2018-07-07 LAB
ALBUMIN SERPL-MCNC: 4 G/DL (ref 3.4–5)
ALBUMIN UR-MCNC: NEGATIVE MG/DL
ALP SERPL-CCNC: 71 U/L (ref 40–150)
ALT SERPL W P-5'-P-CCNC: 15 U/L (ref 0–50)
AMORPH CRY #/AREA URNS HPF: ABNORMAL /HPF
ANION GAP SERPL CALCULATED.3IONS-SCNC: 9 MMOL/L (ref 3–14)
APPEARANCE UR: ABNORMAL
AST SERPL W P-5'-P-CCNC: 13 U/L (ref 0–45)
BASOPHILS # BLD AUTO: 0.1 10E9/L (ref 0–0.2)
BASOPHILS NFR BLD AUTO: 0.9 %
BILIRUB SERPL-MCNC: 0.1 MG/DL (ref 0.2–1.3)
BILIRUB UR QL STRIP: NEGATIVE
BUN SERPL-MCNC: 12 MG/DL (ref 7–30)
CALCIUM SERPL-MCNC: 9.2 MG/DL (ref 8.5–10.1)
CHLORIDE SERPL-SCNC: 103 MMOL/L (ref 94–109)
CO2 SERPL-SCNC: 30 MMOL/L (ref 20–32)
COLOR UR AUTO: YELLOW
CREAT SERPL-MCNC: 0.78 MG/DL (ref 0.52–1.04)
DIFFERENTIAL METHOD BLD: NORMAL
EOSINOPHIL NFR BLD AUTO: 1.9 %
ERYTHROCYTE [DISTWIDTH] IN BLOOD BY AUTOMATED COUNT: 12.3 % (ref 10–15)
GFR SERPL CREATININE-BSD FRML MDRD: 83 ML/MIN/1.7M2
GLUCOSE SERPL-MCNC: 117 MG/DL (ref 70–99)
GLUCOSE UR STRIP-MCNC: NEGATIVE MG/DL
HCG UR QL: NEGATIVE
HCT VFR BLD AUTO: 44.2 % (ref 35–47)
HGB BLD-MCNC: 15.2 G/DL (ref 11.7–15.7)
HGB UR QL STRIP: ABNORMAL
IMM GRANULOCYTES # BLD: 0 10E9/L (ref 0–0.4)
IMM GRANULOCYTES NFR BLD: 0.4 %
KETONES UR STRIP-MCNC: NEGATIVE MG/DL
LEUKOCYTE ESTERASE UR QL STRIP: NEGATIVE
LIPASE SERPL-CCNC: 395 U/L (ref 73–393)
LYMPHOCYTES # BLD AUTO: 3 10E9/L (ref 0.8–5.3)
LYMPHOCYTES NFR BLD AUTO: 30.8 %
MCH RBC QN AUTO: 30.3 PG (ref 26.5–33)
MCHC RBC AUTO-ENTMCNC: 34.4 G/DL (ref 31.5–36.5)
MCV RBC AUTO: 88 FL (ref 78–100)
MONOCYTES # BLD AUTO: 0.8 10E9/L (ref 0–1.3)
MONOCYTES NFR BLD AUTO: 8 %
MUCOUS THREADS #/AREA URNS LPF: PRESENT /LPF
NEUTROPHILS # BLD AUTO: 5.6 10E9/L (ref 1.6–8.3)
NEUTROPHILS NFR BLD AUTO: 58 %
NITRATE UR QL: NEGATIVE
NRBC # BLD AUTO: 0 10*3/UL
NRBC BLD AUTO-RTO: 0 /100
PH UR STRIP: 6 PH (ref 5–7)
PLATELET # BLD AUTO: 322 10E9/L (ref 150–450)
POTASSIUM SERPL-SCNC: 3.9 MMOL/L (ref 3.4–5.3)
PROT SERPL-MCNC: 7.4 G/DL (ref 6.8–8.8)
RBC # BLD AUTO: 5.02 10E12/L (ref 3.8–5.2)
RBC #/AREA URNS AUTO: 11 /HPF (ref 0–2)
SODIUM SERPL-SCNC: 142 MMOL/L (ref 133–144)
SOURCE: ABNORMAL
SP GR UR STRIP: 1.01 (ref 1–1.03)
SQUAMOUS #/AREA URNS AUTO: 1 /HPF (ref 0–1)
UROBILINOGEN UR STRIP-MCNC: 0 MG/DL (ref 0–2)
WBC # BLD AUTO: 9.7 10E9/L (ref 4–11)
WBC #/AREA URNS AUTO: 0 /HPF (ref 0–5)

## 2018-07-07 PROCEDURE — 25000132 ZZH RX MED GY IP 250 OP 250 PS 637: Performed by: FAMILY MEDICINE

## 2018-07-07 PROCEDURE — 80053 COMPREHEN METABOLIC PANEL: CPT | Performed by: FAMILY MEDICINE

## 2018-07-07 PROCEDURE — 96375 TX/PRO/DX INJ NEW DRUG ADDON: CPT | Performed by: FAMILY MEDICINE

## 2018-07-07 PROCEDURE — 81025 URINE PREGNANCY TEST: CPT | Performed by: FAMILY MEDICINE

## 2018-07-07 PROCEDURE — 99285 EMERGENCY DEPT VISIT HI MDM: CPT | Mod: Z6 | Performed by: FAMILY MEDICINE

## 2018-07-07 PROCEDURE — 83690 ASSAY OF LIPASE: CPT | Performed by: FAMILY MEDICINE

## 2018-07-07 PROCEDURE — 85025 COMPLETE CBC W/AUTO DIFF WBC: CPT | Performed by: FAMILY MEDICINE

## 2018-07-07 PROCEDURE — 96374 THER/PROPH/DIAG INJ IV PUSH: CPT | Performed by: FAMILY MEDICINE

## 2018-07-07 PROCEDURE — 76705 ECHO EXAM OF ABDOMEN: CPT

## 2018-07-07 PROCEDURE — 81001 URINALYSIS AUTO W/SCOPE: CPT | Performed by: FAMILY MEDICINE

## 2018-07-07 PROCEDURE — 99285 EMERGENCY DEPT VISIT HI MDM: CPT | Mod: 25 | Performed by: FAMILY MEDICINE

## 2018-07-07 PROCEDURE — 25000128 H RX IP 250 OP 636: Performed by: FAMILY MEDICINE

## 2018-07-07 PROCEDURE — 96376 TX/PRO/DX INJ SAME DRUG ADON: CPT | Performed by: FAMILY MEDICINE

## 2018-07-07 RX ORDER — HYDROMORPHONE HYDROCHLORIDE 1 MG/ML
0.5 INJECTION, SOLUTION INTRAMUSCULAR; INTRAVENOUS; SUBCUTANEOUS
Status: DISCONTINUED | OUTPATIENT
Start: 2018-07-07 | End: 2018-07-08 | Stop reason: HOSPADM

## 2018-07-07 RX ORDER — ONDANSETRON 2 MG/ML
4 INJECTION INTRAMUSCULAR; INTRAVENOUS EVERY 30 MIN PRN
Status: COMPLETED | OUTPATIENT
Start: 2018-07-07 | End: 2018-07-08

## 2018-07-07 RX ORDER — OXYCODONE AND ACETAMINOPHEN 5; 325 MG/1; MG/1
2 TABLET ORAL ONCE
Status: COMPLETED | OUTPATIENT
Start: 2018-07-07 | End: 2018-07-07

## 2018-07-07 RX ORDER — KETOROLAC TROMETHAMINE 30 MG/ML
30 INJECTION, SOLUTION INTRAMUSCULAR; INTRAVENOUS ONCE
Status: COMPLETED | OUTPATIENT
Start: 2018-07-07 | End: 2018-07-07

## 2018-07-07 RX ADMIN — HYDROMORPHONE HYDROCHLORIDE 0.5 MG: 1 INJECTION, SOLUTION INTRAMUSCULAR; INTRAVENOUS; SUBCUTANEOUS at 21:54

## 2018-07-07 RX ADMIN — OXYCODONE HYDROCHLORIDE AND ACETAMINOPHEN 2 TABLET: 5; 325 TABLET ORAL at 22:33

## 2018-07-07 RX ADMIN — HYDROMORPHONE HYDROCHLORIDE 0.5 MG: 1 INJECTION, SOLUTION INTRAMUSCULAR; INTRAVENOUS; SUBCUTANEOUS at 22:14

## 2018-07-07 RX ADMIN — ONDANSETRON 4 MG: 2 INJECTION INTRAMUSCULAR; INTRAVENOUS at 21:54

## 2018-07-07 RX ADMIN — KETOROLAC TROMETHAMINE 30 MG: 30 INJECTION, SOLUTION INTRAMUSCULAR at 21:55

## 2018-07-07 NOTE — ED AVS SNAPSHOT
[facility] Emergency Department    [address] [place] [address]    Phone:  [phone]    Fax:  [phone]                                       Stephania Barahona   MRN: [id number]    Department:  [facility] Emergency Department   Date of Visit:  7/7/2018           After Visit Summary Signature Page     I have received my discharge instructions, and my questions have been answered. I have discussed any challenges I see with this plan with the nurse or doctor.    ..........................................................................................................................................  Patient/Patient Representative Signature      ..........................................................................................................................................  Patient Representative Print Name and Relationship to Patient    ..................................................               ................................................  Date                                            Time    ..........................................................................................................................................  Reviewed by Signature/Title    ...................................................              ..............................................  Date                                                            Time

## 2018-07-07 NOTE — ED AVS SNAPSHOT
[facility] Emergency Department    [address] [place] [address]    Phone:  [phone]    Fax:  [phone]                                       Stephania Barahona   MRN: [id number]    Department:  [facility] Emergency Department   Date of Visit:  7/7/2018           Patient Information     Date Of Birth          1980        Your diagnoses for this visit were:     Abdominal pain, epigastric     Calculus of gallbladder        You were seen by Aly Galloway MD.      Follow-up Information     Follow up with [place] or [facility] In 5 days.        Follow up with [facility] Emergency Department.    Specialty:  EMERGENCY MEDICINE    Why:  If symptoms worsen    Contact information:    [address]   [place] [address] [phone]    Additional information:    From [address]: Exit at [address] on [address] side of [place]. Turn right on [address]. Turn left at stoplight on [address]. [facility] will be in view two blocks ahead        Discharge Instructions       Thank you for giving us the opportunity to see you. The impression is that you may have esophageal spasms vs gallbladder attack.    Take omeprazole 40 mg daily for the next 2-4 weeks.  Reserve Percocet for moderate to severe breakthrough pain, and Zofran for nausea.    The following medications were given during your stay: Toradol, Dilaudid, Zofran    Since you received an opiate pain medication or sedative during your visit, please do not drive for at least 8 hours.     If you had lab work, cultures or imaging studies done during your stay, the final results may still be pending. We will call you if your plan of care needs to change.     We will get you scheduled with a primary care provider within the next 5 days.    After discharge, please closely monitor for any new or worsening symptoms. Return to the Emergency Department at any time if your symptoms worsen.        24 Hour  Appointment Hotline       To make an appointment at any [facility], call [phone] ([phone]). If you don't have a family doctor or clinic, we will help you find one. [facility] clinics are conveniently located to serve the needs of you and your family.             Review of your medicines      START taking        Dose / Directions Last dose taken    omeprazole 40 MG capsule   Commonly known as:  priLOSEC   Dose:  40 mg   Quantity:  30 capsule        Take 1 capsule (40 mg) by mouth daily   Refills:  0        ondansetron 4 MG ODT tab   Commonly known as:  ZOFRAN ODT   Dose:  4 mg   Quantity:  8 tablet        Take 1 tablet (4 mg) by mouth every 6 hours as needed for nausea   Refills:  0        oxyCODONE-acetaminophen 5-325 MG per tablet   Commonly known as:  PERCOCET   Dose:  1-2 tablet   Quantity:  12 tablet        Take 1-2 tablets by mouth every 6 hours as needed for breakthrough pain   Refills:  0          Our records show that you are taking the medicines listed below. If these are incorrect, please call your family doctor or clinic.        Dose / Directions Last dose taken    cyclobenzaprine 10 MG tablet   Commonly known as:  FLEXERIL   Quantity:  30 tablet        TAKE 1/2 - 1 TABLET EVERY NIGHT AT BEDTIME   Refills:  2        HYDROcodone-acetaminophen 5-325 MG per tablet   Commonly known as:  NORCO   Dose:  1 tablet   Quantity:  6 tablet        Take 1 tablet by mouth every 6 hours as needed for moderate to severe pain   Refills:  0                Information about OPIOIDS     PRESCRIPTION OPIOIDS: WHAT YOU NEED TO KNOW   We gave you an opioid (narcotic) pain medicine. It is important to manage your pain, but opioids are not always the best choice. You should first try all the other options your care team gave you. Take this medicine for as short a time (and as few doses) as possible.     These medicines have risks:    DO NOT drive when on new or higher doses of pain medicine. These medicines can  affect your alertness and reaction times, and you could be arrested for driving under the influence (DUI). If you need to use opioids long-term, talk to your care team about driving.    DO NOT operate heave machinery    DO NOT do any other dangerous activities while taking these medicines.     DO NOT drink any alcohol while taking these medicines.      If the opioid prescribed includes acetaminophen, DO NOT take with any other medicines that contain acetaminophen. Read all labels carefully. Look for the word  acetaminophen  or  Tylenol.  Ask your pharmacist if you have questions or are unsure.    You can get addicted to pain medicines, especially if you have a history of addiction (chemical, alcohol or substance dependence). Talk to your care team about ways to reduce this risk.    Store your pills in a secure place, locked if possible. We will not replace any lost or stolen medicine. If you don t finish your medicine, please throw away (dispose) as directed by your pharmacist. The [place] Pollution Control Agency has more information about safe disposal: https://www.pca.[facility].[place]/living-green/managing-unwanted-medications.     All opioids tend to cause constipation. Drink plenty of water and eat foods that have a lot of fiber, such as fruits, vegetables, prune juice, apple juice and high-fiber cereal. Take a laxative (Miralax, milk of magnesia, Colace, Senna) if you don t move your bowels at least every other day.         Prescriptions were sent or printed at these locations (3 Prescriptions)                   [facility] - [address]   [address] [place] [address]    Telephone:  [phone]   Fax:  [phone]   Hours:                  E-Prescribed (1 of 3)         ondansetron (ZOFRAN ODT) 4 MG ODT tab                 Printed at Department/Unit printer (2 of 3)         omeprazole (PRILOSEC) 40 MG capsule               oxyCODONE-acetaminophen (PERCOCET)  "5-325 MG per tablet                Procedures and tests performed during your visit     CBC with platelets differential    Comprehensive metabolic panel    HCG qualitative urine (UPT)    Lipase    Peripheral IV: Standard    Routine UA with microscopic    US Abdomen Limited      Orders Needing Specimen Collection     None      Pending Results     No orders found for last 3 day(s).            Pending Culture Results     No orders found for last 3 day(s).            Pending Results Instructions     If you had any lab results that were not finalized at the time of your Discharge, you can call the ED Lab Result RN at [phone]. You will be contacted by this team for any positive Lab results or changes in treatment. The nurses are available 7 days a week from 10A to 6:30P.  You can leave a message 24 hours per day and they will return your call.        Thank you for choosing [facility]       Thank you for choosing [facility] for your care. Our goal is always to provide you with excellent care. Hearing back from our patients is one way we can continue to improve our services. Please take a few minutes to complete the written survey that you may receive in the mail after you visit with us. Thank you!        WHATT Information     WHATT lets you send messages to your doctor, view your test results, renew your prescriptions, schedule appointments and more. To sign up, go to [email or web address] . Click on \"Log in\" on the left side of the screen, which will take you to the Welcome page. Then click on \"Sign up Now\" on the right side of the page.     You will be asked to enter the access code listed below, as well as some personal information. Please follow the directions to create your username and password.     Your access code is: [email or web address]  Expires: 2018  3:49 PM     Your access code will  in 90 days. If you need help or a new code, please call your [facility] clinic or [phone].        Care EveryWhere " ID     This is your Care EveryWhere ID. This could be used by other organizations to access your [facility] medical records  [id number]        Equal Access to Services     SAMPSON MORALES : Marc Wolf, hermann robles, marisela garcia, sailaja boykin. So [facility] [phone].    ATENCIÓN: Si habla español, tiene a jamison disposición servicios gratuitos de asistencia lingüística. Llame al [phone].    We comply with applicable federal civil rights laws and [place] laws. We do not discriminate on the basis of race, color, national origin, age, disability, sex, sexual orientation, or gender identity.            After Visit Summary       This is your record. Keep this with you and show to your community pharmacist(s) and doctor(s) at your next visit.

## 2018-07-08 VITALS
TEMPERATURE: 97.3 F | SYSTOLIC BLOOD PRESSURE: 132 MMHG | RESPIRATION RATE: 18 BRPM | DIASTOLIC BLOOD PRESSURE: 67 MMHG | HEART RATE: 68 BPM | OXYGEN SATURATION: 99 %

## 2018-07-08 PROCEDURE — 25000128 H RX IP 250 OP 636: Performed by: FAMILY MEDICINE

## 2018-07-08 RX ORDER — ONDANSETRON 4 MG/1
4 TABLET, ORALLY DISINTEGRATING ORAL EVERY 6 HOURS PRN
Qty: 8 TABLET | Refills: 0 | Status: SHIPPED | OUTPATIENT
Start: 2018-07-08 | End: 2018-07-13

## 2018-07-08 RX ORDER — OMEPRAZOLE 40 MG/1
40 CAPSULE, DELAYED RELEASE ORAL DAILY
Qty: 30 CAPSULE | Refills: 0 | Status: SHIPPED | OUTPATIENT
Start: 2018-07-08 | End: 2018-08-07

## 2018-07-08 RX ORDER — OXYCODONE AND ACETAMINOPHEN 5; 325 MG/1; MG/1
1-2 TABLET ORAL EVERY 6 HOURS PRN
Qty: 12 TABLET | Refills: 0 | Status: SHIPPED | OUTPATIENT
Start: 2018-07-08 | End: 2018-07-18

## 2018-07-08 RX ADMIN — ONDANSETRON 4 MG: 2 INJECTION INTRAMUSCULAR; INTRAVENOUS at 00:28

## 2018-07-08 NOTE — DISCHARGE INSTRUCTIONS
Thank you for giving us the opportunity to see you. The impression is that you may have esophageal spasms vs gallbladder attack.    Take omeprazole 40 mg daily for the next 2-4 weeks.  Reserve Percocet for moderate to severe breakthrough pain, and Zofran for nausea.    The following medications were given during your stay: Toradol, Dilaudid, Zofran    Since you received an opiate pain medication or sedative during your visit, please do not drive for at least 8 hours.     If you had lab work, cultures or imaging studies done during your stay, the final results may still be pending. We will call you if your plan of care needs to change.     We will get you scheduled with a primary care provider within the next 5 days.    After discharge, please closely monitor for any new or worsening symptoms. Return to the Emergency Department at any time if your symptoms worsen.

## 2018-07-08 NOTE — ED PROVIDER NOTES
"                                                            [facility] ED Provider Note   CC:     Chief Complaint   Patient presents with     Flank Pain     HPI:  Stephania Barahona is a 38 year old female who presented to the emergency department with acute onset of epigastric and right upper quadrant pain that started this morning.  Patient had a salad that was left over from [employer], and a couple of hours later she developed the \"indigestion\" type pain.  The pain intensified and has moved into the right upper quadrant and has worsened over the day.  She has pain with deep breathing, the pain radiates into the back and up into the right breast and chest.  She does not have any cough but feels slightly short of breath because of the worsening pain with breathing.  She does not have any fever, and although she feels nauseated has not had any vomiting.  Her stools have been normal.  She is just starting her menstrual cycle, and does not think that she is pregnant she has never had this type of pain before.  She has a past surgical history of tonsillectomy and ovarian cystectomy.  She is accompanied by her mother.  She started smoking again and reports rare intake of alcohol.  There is no personal or family history of gallbladder disease.    Problem List:  Patient Active Problem List    Diagnosis     Hypertriglyceridemia     CARDIOVASCULAR SCREENING; LDL GOAL LESS THAN 160     Female infertility     Closed fracture of lumbar vertebra (H)     Degeneration of lumbar or lumbosacral intervertebral disc       MEDS:   Discharge Medication List as of 7/8/2018 12:49 AM      CONTINUE these medications which have NOT CHANGED    Details   cyclobenzaprine (FLEXERIL) 10 MG tablet TAKE 1/2 - 1 TABLET EVERY NIGHT AT BEDTIME, Disp-30 tablet, R-2, E-Prescribe      HYDROcodone-acetaminophen (NORCO) 5-325 MG per tablet Take 1 tablet by mouth every 6 hours as needed for moderate to severe pain, Disp-6 tablet, R-0, Local Print    "          ALLERGIES:    Allergies   Allergen Reactions     Clindamycin/Lincomycin Nausea     Nubain [Nalbuphine Hcl] Nausea and Vomiting     And dizziness       Past Surgical History:   Procedure Laterality Date     C NONSPECIFIC PROCEDURE  2007    lumbar fracture     D & C  03/25/11     TONSILLECTOMY  1991       Social History   Substance Use Topics     Smoking status: Current Every Day Smoker     Smokeless tobacco: Never Used      Comment: 2004     Alcohol use No      Comment: rarely         Review of Systems   Except as noted in HPI, all other systems were reviewed and are negative    Physical Exam     Vitals were reviewed  Patient Vitals for the past 8 hrs:   BP Temp Pulse Resp SpO2   07/08/18 0118 132/67 - 68 18 -   07/07/18 2125 133/88 97.3  F (36.3  C) 98 20 99 %     GENERAL APPEARANCE: Alert, moderate to severe distress, patient is laying in bed very uncomfortably  FACE: normal facies  EYES: Pupils are equal; no scleral icterus  HENT: normal external exam  NECK: no adenopathy or asymmetry  RESP: normal respiratory effort; clear breath sounds bilaterally  CV: regular rate and rhythm; no significant murmurs, gallops or rubs  ABD: soft, moderate to severe epigastric and right upper quadrant tenderness; no rebound or guarding; bowel sounds are normal  MS: no gross deformities noted; normal muscle tone.  EXT: No calf tenderness or pitting edema  SKIN: no worrisome rash  NEURO: no facial droop; no focal deficits, speech is normal  PSYCH: normal mood and affect      Available Lab/Imaging Results     Results for orders placed or performed during the hospital encounter of 07/07/18 (from the past 24 hour(s))   Routine UA with microscopic   Result Value Ref Range    Color Urine Yellow     Appearance Urine Cloudy     Glucose Urine Negative NEG^Negative mg/dL    Bilirubin Urine Negative NEG^Negative    Ketones Urine Negative NEG^Negative mg/dL    Specific Gravity Urine 1.015 1.003 - 1.035    Blood Urine Large (A)  NEG^Negative    pH Urine 6.0 5.0 - 7.0 pH    Protein Albumin Urine Negative NEG^Negative mg/dL    Urobilinogen mg/dL 0.0 0.0 - 2.0 mg/dL    Nitrite Urine Negative NEG^Negative    Leukocyte Esterase Urine Negative NEG^Negative    Source Midstream Urine     WBC Urine 0 0 - 5 /HPF    RBC Urine 11 (H) 0 - 2 /HPF    Squamous Epithelial /HPF Urine 1 0 - 1 /HPF    Mucous Urine Present (A) NEG^Negative /LPF    Amorphous Crystals Few (A) NEG^Negative /HPF   HCG qualitative urine (UPT)   Result Value Ref Range    HCG Qual Urine Negative NEG^Negative   CBC with platelets differential   Result Value Ref Range    WBC 9.7 4.0 - 11.0 10e9/L    RBC Count 5.02 3.8 - 5.2 10e12/L    Hemoglobin 15.2 11.7 - 15.7 g/dL    Hematocrit 44.2 35.0 - 47.0 %    MCV 88 78 - 100 fl    MCH 30.3 26.5 - 33.0 pg    MCHC 34.4 31.5 - 36.5 g/dL    RDW 12.3 10.0 - 15.0 %    Platelet Count 322 150 - 450 10e9/L    Diff Method Automated Method     % Neutrophils 58.0 %    % Lymphocytes 30.8 %    % Monocytes 8.0 %    % Eosinophils 1.9 %    % Basophils 0.9 %    % Immature Granulocytes 0.4 %    Nucleated RBCs 0 0 /100    Absolute Neutrophil 5.6 1.6 - 8.3 10e9/L    Absolute Lymphocytes 3.0 0.8 - 5.3 10e9/L    Absolute Monocytes 0.8 0.0 - 1.3 10e9/L    Absolute Basophils 0.1 0.0 - 0.2 10e9/L    Abs Immature Granulocytes 0.0 0 - 0.4 10e9/L    Absolute Nucleated RBC 0.0    Comprehensive metabolic panel   Result Value Ref Range    Sodium 142 133 - 144 mmol/L    Potassium 3.9 3.4 - 5.3 mmol/L    Chloride 103 94 - 109 mmol/L    Carbon Dioxide 30 20 - 32 mmol/L    Anion Gap 9 3 - 14 mmol/L    Glucose 117 (H) 70 - 99 mg/dL    Urea Nitrogen 12 7 - 30 mg/dL    Creatinine 0.78 0.52 - 1.04 mg/dL    GFR Estimate 83 >60 mL/min/1.7m2    GFR Estimate If Black >90 >60 mL/min/1.7m2    Calcium 9.2 8.5 - 10.1 mg/dL    Bilirubin Total 0.1 (L) 0.2 - 1.3 mg/dL    Albumin 4.0 3.4 - 5.0 g/dL    Protein Total 7.4 6.8 - 8.8 g/dL    Alkaline Phosphatase 71 40 - 150 U/L    ALT 15 0 - 50 U/L     AST 13 0 - 45 U/L   Lipase   Result Value Ref Range    Lipase 395 (H) 73 - 393 U/L   US Abdomen Limited    Narrative    US ABDOMEN LIMITED 7/8/2018 12:07 AM     HISTORY: Right upper quadrant abdominal pain.      FINDINGS:  Liver is normal in echogenicity without focal lesions. The  gallbladder contains a mobile gallstone. No associated gallbladder  wall thickening or pericholecystic fluid. Common bile duct is normal  in diameter. Pancreas is normal where visualized. Examination of the  right kidney is unremarkable.      Impression    IMPRESSION:  Cholelithiasis without ultrasound evidence of  cholecystitis.    VASILE WAY MD                Impression     Final diagnoses:   Abdominal pain, epigastric   Calculus of gallbladder         ED Course & Medical Decision Making   Stephania Barahona is a 38 year old female who presented to the emergency department with acute onset of epigastric and right upper quadrant pain that started this morning.  Patient initially thought that she was having indigestion.  She does have occasional reflux symptoms.  The pain intensified as the day went on to the point where it was unbearable. This was worsened by deep breathing.  Patient had no previous episodes of this and had no previous abdominal surgeries. Vital signs reveal a temp of 97.3, blood pressure 133/88 with heart rate of 98.  Patient was accompanied by her mother.  the patient appeared to be experiencing colicky abdominal pain, and there was concern for possibility of acute cholecystitis.  Patient's workup however reveals a normal CBC, comprehensive metabolic panel, and slightly elevated lipase level of 395.  Her glucose was 117.  Urine pregnancy test is negative, and urinalysis revealed 11 red blood cells, but she is currently having her menses.  A right upper quadrant ultrasound was delayed because the ultrasound tech was at another hospital up [place].  She eventually had her ultrasound completed and this shows that the  gallbladder contains a mobile gallstone without any associated gallbladder wall thickening or pericholecystic fluid.  Common bile duct is normal in diameter.  The patient's pain was managed with a combination of IV Toradol, Dilaudid, and Zofran.  I reviewed the results with the patient, and I suspect that given the rapid onset of her symptoms and the intensity, that she may be having an acute gallbladder attack versus esophageal spasms.  I will have the patient begin a trial of omeprazole 40 mg daily, and I will have her follow-up with the primary care provider in the next 3-5 days for recheck.  She may need an outpatient HIDA scan.  Return to the ED at any time if symptoms worsen.        Medications   sodium chloride (PF) 0.9% PF flush 3 mL (not administered)   HYDROmorphone (PF) (DILAUDID) injection 0.5 mg (0.5 mg Intravenous Given 7/7/18 4234)   ketorolac (TORADOL) injection 30 mg (30 mg Intravenous Given 7/7/18 2155)   ondansetron (ZOFRAN) injection 4 mg (4 mg Intravenous Given 7/8/18 0028)   oxyCODONE-acetaminophen (PERCOCET) 5-325 MG per tablet 2 tablet (2 tablets Oral Given 7/7/18 2233)                      Written after-visit summary and instructions were given at the time of discharge.      Discharge Medication List as of 7/8/2018 12:49 AM      START taking these medications    Details   omeprazole (PRILOSEC) 40 MG capsule Take 1 capsule (40 mg) by mouth daily, Disp-30 capsule, R-0, Local Print      ondansetron (ZOFRAN ODT) 4 MG ODT tab Take 1 tablet (4 mg) by mouth every 6 hours as needed for nausea, Disp-8 tablet, R-0, E-Prescribe      oxyCODONE-acetaminophen (PERCOCET) 5-325 MG per tablet Take 1-2 tablets by mouth every 6 hours as needed for breakthrough pain, Disp-12 tablet, R-0, Local Print                  This note was dictated using electronic voice recognition software and although reviewed, may contain grammatical and spelling errors.        Aly Galloway MD  07/08/18 0148

## 2018-07-12 ENCOUNTER — OFFICE VISIT (OUTPATIENT)
Dept: FAMILY MEDICINE | Facility: OTHER | Age: 38
End: 2018-07-12

## 2018-07-12 VITALS
SYSTOLIC BLOOD PRESSURE: 110 MMHG | WEIGHT: 177 LBS | DIASTOLIC BLOOD PRESSURE: 68 MMHG | HEART RATE: 112 BPM | HEIGHT: 65 IN | RESPIRATION RATE: 20 BRPM | OXYGEN SATURATION: 98 % | TEMPERATURE: 97.6 F | BODY MASS INDEX: 29.49 KG/M2

## 2018-07-12 DIAGNOSIS — R10.13 ABDOMINAL PAIN, EPIGASTRIC: Primary | ICD-10-CM

## 2018-07-12 DIAGNOSIS — M51.379 DEGENERATION OF LUMBAR OR LUMBOSACRAL INTERVERTEBRAL DISC: ICD-10-CM

## 2018-07-12 PROCEDURE — 99214 OFFICE O/P EST MOD 30 MIN: CPT | Performed by: NURSE PRACTITIONER

## 2018-07-12 RX ORDER — CYCLOBENZAPRINE HCL 10 MG
TABLET ORAL
Qty: 30 TABLET | Refills: 2 | Status: SHIPPED | OUTPATIENT
Start: 2018-07-12 | End: 2020-06-12

## 2018-07-12 ASSESSMENT — PAIN SCALES - GENERAL: PAINLEVEL: MILD PAIN (2)

## 2018-07-12 NOTE — PROGRESS NOTES
SUBJECTIVE:   Stephania Barahona is a 38 year old female who presents to clinic today for the following health issues:      ED/UC Followup:    Facility:  [facility]  Date of visit: 7/7/18  Reason for visit: abdominal pain/epigastric  Current Status: better, wanting refill of flexeril from previous back issues     Excerpt from ED record:     ED Course & Medical Decision Making   Stephania Barahona is a 38 year old female who presented to the emergency department with acute onset of epigastric and right upper quadrant pain that started this morning.  Patient initially thought that she was having indigestion.  She does have occasional reflux symptoms.  The pain intensified as the day went on to the point where it was unbearable. This was worsened by deep breathing.  Patient had no previous episodes of this and had no previous abdominal surgeries. Vital signs reveal a temp of 97.3, blood pressure 133/88 with heart rate of 98.  Patient was accompanied by her mother.  the patient appeared to be experiencing colicky abdominal pain, and there was concern for possibility of acute cholecystitis.  Patient's workup however reveals a normal CBC, comprehensive metabolic panel, and slightly elevated lipase level of 395.  Her glucose was 117.  Urine pregnancy test is negative, and urinalysis revealed 11 red blood cells, but she is currently having her menses.  A right upper quadrant ultrasound was delayed because the ultrasound tech was at another hospital up [place].  She eventually had her ultrasound completed and this shows that the gallbladder contains a mobile gallstone without any associated gallbladder wall thickening or pericholecystic fluid.  Common bile duct is normal in diameter.  The patient's pain was managed with a combination of IV Toradol, Dilaudid, and Zofran.  I reviewed the results with the patient, and I suspect that given the rapid onset of her symptoms and the intensity, that she may be having an acute gallbladder  attack versus esophageal spasms.  I will have the patient begin a trial of omeprazole 40 mg daily, and I will have her follow-up with the primary care provider in the next 3-5 days for recheck.  She may need an outpatient HIDA scan.  Return to the ED at any time if symptoms worsen.       Since ED discharge, pain is significantly improved.  Feels bloated still.  Eating small portions at a time to avoid further bloating.  She started Omeprazole 2 days ago.      Caffeine use is minimal.   History of heavy NSAID use years ago, nothing recently  Is smoking  Rarely drinks alcohol.     No fevers/chills/nausea/vomiting/diarrhea.  Pain is in the epigastric area, intermittent.      Also wants a refill of her Flexeril for chronic low back pain.  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  Last filled Flexeril in 2016 and just ran out.     Problem list and histories reviewed & adjusted, as indicated.  Additional history: as documented    Patient Active Problem List   Diagnosis     Closed fracture of lumbar vertebra (H)     Degeneration of lumbar or lumbosacral intervertebral disc     Female infertility     CARDIOVASCULAR SCREENING; LDL GOAL LESS THAN 160     Hypertriglyceridemia     Past Surgical History:   Procedure Laterality Date     C NONSPECIFIC PROCEDURE  2007    lumbar fracture     D & C  03/25/11     TONSILLECTOMY  1991       Social History   Substance Use Topics     Smoking status: Current Every Day Smoker     Smokeless tobacco: Never Used      Comment: 2004     Alcohol use No      Comment: rarely     Family History   Problem Relation Age of Onset     Asthma Sister      Diabetes Sister      gestational diabetes     Asthma Sister      Asthma Brother      Psychotic Disorder Maternal Grandfather      Depression Maternal Grandfather      Alcohol/Drug Maternal Grandfather      Alzheimer Disease Paternal Grandfather      Dementia         Current Outpatient Prescriptions   Medication Sig  "Dispense Refill     cyclobenzaprine (FLEXERIL) 10 MG tablet TAKE 1/2 - 1 TABLET EVERY NIGHT AT BEDTIME 30 tablet 2     omeprazole (PRILOSEC) 40 MG capsule Take 1 capsule (40 mg) by mouth daily 30 capsule 0     ondansetron (ZOFRAN ODT) 4 MG ODT tab Take 1 tablet (4 mg) by mouth every 6 hours as needed for nausea 8 tablet 0     oxyCODONE-acetaminophen (PERCOCET) 5-325 MG per tablet Take 1-2 tablets by mouth every 6 hours as needed for breakthrough pain 12 tablet 0     Allergies   Allergen Reactions     Clindamycin/Lincomycin Nausea     Nubain [Nalbuphine Hcl] Nausea and Vomiting     And dizziness     BP Readings from Last 3 Encounters:   07/12/18 110/68   07/08/18 132/67   06/25/18 121/81    Wt Readings from Last 3 Encounters:   07/12/18 177 lb (80.3 kg)   11/09/17 165 lb (74.8 kg)   10/11/17 169 lb (76.7 kg)                    Reviewed and updated as needed this visit by clinical staff  Tobacco  Allergies  Meds  Soc Hx      Reviewed and updated as needed this visit by Provider         ROS:  Constitutional, HEENT, cardiovascular, pulmonary, gi and gu systems are negative, except as otherwise noted.    OBJECTIVE:     /68  Pulse 112  Temp 97.6  F (36.4  C) (Tympanic)  Resp 20  Ht 5' 4.5\" (1.638 m)  Wt 177 lb (80.3 kg)  LMP 07/07/2018  SpO2 98%  Breastfeeding? No  BMI 29.91 kg/m2  Body mass index is 29.91 kg/(m^2).  GENERAL: healthy, alert and no distress  NECK: no adenopathy, no asymmetry, masses, or scars and thyroid normal to palpation  RESP: lungs clear to auscultation - no rales, rhonchi or wheezes  CV: regular rate and rhythm, normal S1 S2, no S3 or S4, no murmur, click or rub, no peripheral edema and peripheral pulses strong  ABDOMEN: soft, mild epigastric tenderness, no RUQ tenderness, no organomegaly or masses, bowel sounds normal  MS: no gross musculoskeletal defects noted, no edema    Diagnostic Test Results:  none     ASSESSMENT/PLAN:         1. Abdominal pain, epigastric  She has only been " on Omeprazole 2 days.  Symptoms seem gastric in nature, will have he continue on the Omeprazole, do some dietary modifications and follow up if this fails to improve.  If symptoms worsen or clear RUQ symptoms, consider HIDA scan in the future.  May also consider upper endoscopy if symptoms fail to improve.  Advised smoking cessation.  She is due for a colonoscopy to evaluate two positive FIT tests and she was advised to schedule that also.     2. Degeneration of lumbar or lumbosacral intervertebral disc  Chronic, controlled.  No change in treatment plan.  Using Flexeril sparingly.   - cyclobenzaprine (FLEXERIL) 10 MG tablet; TAKE 1/2 - 1 TABLET EVERY NIGHT AT BEDTIME  Dispense: 30 tablet; Refill: 2    See Patient Instructions    LINDA Young [facility]

## 2018-07-12 NOTE — MR AVS SNAPSHOT
"              After Visit Summary   7/12/2018    Stephania Barahona    MRN: [id number]           Patient Information     Date Of Birth          1980        Visit Information        Provider Department      7/12/2018 10:00 AM Britney Rodriguez APRN CNP [facility]        Today's Diagnoses     LUMB/LUMBOSAC DISC DEGEN    -  1    Abdominal pain, epigastric          Care Instructions    Stay on the Omeprazole once a day for the next 4-6 weeks.     If this gets worse, let me know.     Schedule the colonoscopy.     You are due for a pap smear.  Please schedule this important screening test.     Stop smoking.  Let us know if we can help with this.  There are multiple things we can do to assist you.                      Follow-ups after your visit        Who to contact     If you have questions or need follow up information about today's clinic visit or your schedule please contact [facility] directly at [phone].  Normal or non-critical lab and imaging results will be communicated to you by [employer], letter or phone within 4 business days after the clinic has received the results. If you do not hear from us within 7 days, please contact the clinic through [employer] or phone. If you have a critical or abnormal lab result, we will notify you by phone as soon as possible.  Submit refill requests through [employer] or call your pharmacy and they will forward the refill request to us. Please allow 3 business days for your refill to be completed.          Additional Information About Your Visit        MyChart Information     [employer] lets you send messages to your doctor, view your test results, renew your prescriptions, schedule appointments and more. To sign up, go to [email or web address] . Click on \"Log in\" on the left side of the screen, which will take you to the Welcome page. Then click on \"Sign up Now\" on the right side of the page.     You will be asked to enter the access code listed below, as " "well as some personal information. Please follow the directions to create your username and password.     Your access code is: [email or web address]  Expires: 2018  3:49 PM     Your access code will  in 90 days. If you need help or a new code, please call your [place] clinic or [phone].        Care EveryWhere ID     This is your Care EveryWhere ID. This could be used by other organizations to access your [place] medical records  [id number]        Your Vitals Were     Pulse Temperature Respirations Height Last Period Pulse Oximetry    112 97.6  F (36.4  C) (Tympanic) 20 5' 4.5\" (1.638 m) 2018 98%    Breastfeeding? BMI (Body Mass Index)                No 29.91 kg/m2           Blood Pressure from Last 3 Encounters:   18 110/68   18 132/67   18 121/81    Weight from Last 3 Encounters:   18 177 lb (80.3 kg)   17 165 lb (74.8 kg)   10/11/17 169 lb (76.7 kg)              Today, you had the following     No orders found for display         Where to get your medicines      These medications were sent to [facility] [place] [address]   [address] [facility] [address]     Phone:  [phone]     cyclobenzaprine 10 MG tablet          Primary Care Provider Fax #    Physician No Ref-Primary [phone]       No address on file        Equal Access to Services     FOSTER MORALES : Hadii disha moncadao Soferchoali, waaxda luqadaha, qaybta kaalmada jose, sailaja boykin. So [facility] [place] [phone].    ATENCIÓN: Si habla español, tiene a jamison disposición servicios gratuitos de asistencia lingüística. Llame al [phone].    We comply with applicable federal civil rights laws and [place] laws. We do not discriminate on the basis of race, color, national origin, age, disability, sex, sexual orientation, or gender identity.            Thank you!     Thank you for choosing [facility]  for your care. Our goal is always to provide you " with excellent care. Hearing back from our patients is one way we can continue to improve our services. Please take a few minutes to complete the written survey that you may receive in the mail after your visit with us. Thank you!             Your Updated Medication List - Protect others around you: Learn how to safely use, store and throw away your medicines at www.disposemymeds.org.          This list is accurate as of 7/12/18 10:38 AM.  Always use your most recent med list.                   Brand Name Dispense Instructions for use Diagnosis    cyclobenzaprine 10 MG tablet    FLEXERIL    30 tablet    TAKE 1/2 - 1 TABLET EVERY NIGHT AT BEDTIME    Degeneration of lumbar or lumbosacral intervertebral disc       omeprazole 40 MG capsule    priLOSEC    30 capsule    Take 1 capsule (40 mg) by mouth daily        ondansetron 4 MG ODT tab    ZOFRAN ODT    8 tablet    Take 1 tablet (4 mg) by mouth every 6 hours as needed for nausea        oxyCODONE-acetaminophen 5-325 MG per tablet    PERCOCET    12 tablet    Take 1-2 tablets by mouth every 6 hours as needed for breakthrough pain

## 2018-07-12 NOTE — PATIENT INSTRUCTIONS
Stay on the Omeprazole once a day for the next 4-6 weeks.     If this gets worse, let me know.     Schedule the colonoscopy.     You are due for a pap smear.  Please schedule this important screening test.     Stop smoking.  Let us know if we can help with this.  There are multiple things we can do to assist you.

## 2018-07-18 DIAGNOSIS — R10.13 EPIGASTRIC PAIN: Primary | ICD-10-CM

## 2018-07-18 RX ORDER — OXYCODONE AND ACETAMINOPHEN 5; 325 MG/1; MG/1
1-2 TABLET ORAL EVERY 6 HOURS PRN
Qty: 12 TABLET | Refills: 0 | Status: SHIPPED | OUTPATIENT
Start: 2018-07-18 | End: 2019-06-04

## 2018-07-18 NOTE — TELEPHONE ENCOUNTER
percocet      Last Written Prescription Date:  7/8/18  Last Fill Quantity: 12,   # refills: 0  Last Office Visit: 7/12/2018  Future Office visit:    Next 5 appointments (look out 90 days)     Jul 27, 2018 10:00 AM CDT   PHYSICAL with LINDA Young CNP   [facility]    [address] [facility] [address]   [phone]                   Routing refill request to provider for review/approval because:  Drug not on the [facility], [facility] or  Health refill protocol or controlled substance

## 2018-07-18 NOTE — TELEPHONE ENCOUNTER
I have attempted to contact this patient by phone with the following results: no answer, busy signal. Attempted 2 times. Clinic is closed, patient can  tomorrow.

## 2018-07-18 NOTE — TELEPHONE ENCOUNTER
Reason for Call:  Medication or medication refill:    Do you use a [facility]?  Name of the pharmacy and phone number for the current request:  Hard Copy    Name of the medication requested:   oxyCODONE-acetaminophen (PERCOCET) 5-325 MG per tablet 12 tablet 0 7/8/2018         Other request: please call when ready for , would like to  today if possible. Is scheduled for pe on 7/27  Can we leave a detailed message on this number? YES    Phone number patient can be reached at: Home number on file [phone] (home)    Best Time: any time    Call taken on 7/18/2018 at 4:05 PM by Sirisha Kong

## 2018-07-21 ENCOUNTER — HEALTH MAINTENANCE LETTER (OUTPATIENT)
Age: 38
End: 2018-07-21

## 2018-10-03 NOTE — TELEPHONE ENCOUNTER
Rx destroyed, pt never picked it up.  ................Chase Cool LPN,   October 3, 2018,      11:09 AM,   [facility], [place]

## 2019-04-18 ENCOUNTER — TELEPHONE (OUTPATIENT)
Dept: FAMILY MEDICINE | Facility: OTHER | Age: 39
End: 2019-04-18

## 2019-04-18 NOTE — TELEPHONE ENCOUNTER
Panel Management Review      Patient has the following on her problem list: None      Composite cancer screening  Chart review shows that this patient is due/due soon for the following Pap Smear  Summary:    Patient is due/failing the following:   LDL, PAP and PHYSICAL    Action needed:   Patient needs office visit for Physical/ Pap with fasting labs.    Type of outreach:    Phone, left message for patient to call back.   Unable to leave message will try again later.   Questions for provider review:    None                                                                                                                                    Abby Fuller MA

## 2019-06-04 ENCOUNTER — APPOINTMENT (OUTPATIENT)
Dept: ULTRASOUND IMAGING | Facility: CLINIC | Age: 39
End: 2019-06-04
Attending: EMERGENCY MEDICINE
Payer: MEDICAID

## 2019-06-04 ENCOUNTER — HOSPITAL ENCOUNTER (OUTPATIENT)
Facility: CLINIC | Age: 39
Discharge: HOME | End: 2019-06-04
Attending: EMERGENCY MEDICINE | Admitting: SPECIALIST
Payer: MEDICAID

## 2019-06-04 ENCOUNTER — ANESTHESIA EVENT (OUTPATIENT)
Dept: SURGERY | Facility: CLINIC | Age: 39
End: 2019-06-04
Payer: MEDICAID

## 2019-06-04 ENCOUNTER — ANESTHESIA (OUTPATIENT)
Dept: SURGERY | Facility: CLINIC | Age: 39
End: 2019-06-04
Payer: MEDICAID

## 2019-06-04 VITALS
WEIGHT: 178 LBS | BODY MASS INDEX: 29.66 KG/M2 | OXYGEN SATURATION: 98 % | SYSTOLIC BLOOD PRESSURE: 119 MMHG | HEART RATE: 86 BPM | DIASTOLIC BLOOD PRESSURE: 85 MMHG | HEIGHT: 65 IN | TEMPERATURE: 97.7 F | RESPIRATION RATE: 16 BRPM

## 2019-06-04 DIAGNOSIS — R11.0 POSTOPERATIVE NAUSEA: ICD-10-CM

## 2019-06-04 DIAGNOSIS — G89.18 POST-OPERATIVE PAIN: Primary | ICD-10-CM

## 2019-06-04 DIAGNOSIS — Z98.890 POSTOPERATIVE NAUSEA: ICD-10-CM

## 2019-06-04 DIAGNOSIS — R10.11 ABDOMINAL PAIN, RIGHT UPPER QUADRANT: ICD-10-CM

## 2019-06-04 DIAGNOSIS — K81.9 CHOLECYSTITIS: ICD-10-CM

## 2019-06-04 LAB
ALBUMIN SERPL-MCNC: 3.7 G/DL (ref 3.4–5)
ALBUMIN UR-MCNC: NEGATIVE MG/DL
ALP SERPL-CCNC: 98 U/L (ref 40–150)
ALT SERPL W P-5'-P-CCNC: 31 U/L (ref 0–50)
ANION GAP SERPL CALCULATED.3IONS-SCNC: 4 MMOL/L (ref 3–14)
APPEARANCE UR: ABNORMAL
AST SERPL W P-5'-P-CCNC: 82 U/L (ref 0–45)
BACTERIA #/AREA URNS HPF: ABNORMAL /HPF
BASOPHILS # BLD AUTO: 0 10E9/L (ref 0–0.2)
BASOPHILS NFR BLD AUTO: 0.5 %
BILIRUB SERPL-MCNC: 0.4 MG/DL (ref 0.2–1.3)
BILIRUB UR QL STRIP: NEGATIVE
BUN SERPL-MCNC: 8 MG/DL (ref 7–30)
CALCIUM SERPL-MCNC: 8.7 MG/DL (ref 8.5–10.1)
CHLORIDE SERPL-SCNC: 107 MMOL/L (ref 94–109)
CO2 SERPL-SCNC: 32 MMOL/L (ref 20–32)
COLOR UR AUTO: YELLOW
CREAT SERPL-MCNC: 0.85 MG/DL (ref 0.52–1.04)
DIFFERENTIAL METHOD BLD: NORMAL
EOSINOPHIL NFR BLD AUTO: 1.1 %
ERYTHROCYTE [DISTWIDTH] IN BLOOD BY AUTOMATED COUNT: 12 % (ref 10–15)
GFR SERPL CREATININE-BSD FRML MDRD: 87 ML/MIN/{1.73_M2}
GLUCOSE SERPL-MCNC: 99 MG/DL (ref 70–99)
GLUCOSE UR STRIP-MCNC: NEGATIVE MG/DL
HCG UR QL: NEGATIVE
HCT VFR BLD AUTO: 42.4 % (ref 35–47)
HGB BLD-MCNC: 14.4 G/DL (ref 11.7–15.7)
HGB UR QL STRIP: NEGATIVE
IMM GRANULOCYTES # BLD: 0 10E9/L (ref 0–0.4)
IMM GRANULOCYTES NFR BLD: 0.2 %
INR PPP: 0.94 (ref 0.86–1.14)
KETONES UR STRIP-MCNC: NEGATIVE MG/DL
LEUKOCYTE ESTERASE UR QL STRIP: NEGATIVE
LIPASE SERPL-CCNC: 213 U/L (ref 73–393)
LYMPHOCYTES # BLD AUTO: 1.4 10E9/L (ref 0.8–5.3)
LYMPHOCYTES NFR BLD AUTO: 15.9 %
MCH RBC QN AUTO: 30.3 PG (ref 26.5–33)
MCHC RBC AUTO-ENTMCNC: 34 G/DL (ref 31.5–36.5)
MCV RBC AUTO: 89 FL (ref 78–100)
MONOCYTES # BLD AUTO: 0.5 10E9/L (ref 0–1.3)
MONOCYTES NFR BLD AUTO: 6 %
MUCOUS THREADS #/AREA URNS LPF: PRESENT /LPF
NEUTROPHILS # BLD AUTO: 6.7 10E9/L (ref 1.6–8.3)
NEUTROPHILS NFR BLD AUTO: 76.3 %
NITRATE UR QL: NEGATIVE
NRBC # BLD AUTO: 0 10*3/UL
NRBC BLD AUTO-RTO: 0 /100
PH UR STRIP: 7 PH (ref 5–7)
PLATELET # BLD AUTO: 281 10E9/L (ref 150–450)
POTASSIUM SERPL-SCNC: 3.8 MMOL/L (ref 3.4–5.3)
PROT SERPL-MCNC: 6.8 G/DL (ref 6.8–8.8)
RBC # BLD AUTO: 4.75 10E12/L (ref 3.8–5.2)
RBC #/AREA URNS AUTO: 0 /HPF (ref 0–2)
SODIUM SERPL-SCNC: 143 MMOL/L (ref 133–144)
SOURCE: ABNORMAL
SP GR UR STRIP: 1.01 (ref 1–1.03)
SQUAMOUS #/AREA URNS AUTO: 27 /HPF (ref 0–1)
UROBILINOGEN UR STRIP-MCNC: 0 MG/DL (ref 0–2)
WBC # BLD AUTO: 8.8 10E9/L (ref 4–11)
WBC #/AREA URNS AUTO: 0 /HPF (ref 0–5)

## 2019-06-04 PROCEDURE — 27110028 ZZH OR GENERAL SUPPLY NON-STERILE: Performed by: SPECIALIST

## 2019-06-04 PROCEDURE — 27210794 ZZH OR GENERAL SUPPLY STERILE: Performed by: SPECIALIST

## 2019-06-04 PROCEDURE — 71000015 ZZH RECOVERY PHASE 1 LEVEL 2 EA ADDTL HR: Performed by: SPECIALIST

## 2019-06-04 PROCEDURE — 36000058 ZZH SURGERY LEVEL 3 EA 15 ADDTL MIN: Performed by: SPECIALIST

## 2019-06-04 PROCEDURE — 80053 COMPREHEN METABOLIC PANEL: CPT | Performed by: EMERGENCY MEDICINE

## 2019-06-04 PROCEDURE — 96374 THER/PROPH/DIAG INJ IV PUSH: CPT | Performed by: EMERGENCY MEDICINE

## 2019-06-04 PROCEDURE — 37000009 ZZH ANESTHESIA TECHNICAL FEE, EACH ADDTL 15 MIN: Performed by: SPECIALIST

## 2019-06-04 PROCEDURE — 25000128 H RX IP 250 OP 636: Performed by: EMERGENCY MEDICINE

## 2019-06-04 PROCEDURE — 71000014 ZZH RECOVERY PHASE 1 LEVEL 2 FIRST HR: Performed by: SPECIALIST

## 2019-06-04 PROCEDURE — 96376 TX/PRO/DX INJ SAME DRUG ADON: CPT | Performed by: EMERGENCY MEDICINE

## 2019-06-04 PROCEDURE — 88304 TISSUE EXAM BY PATHOLOGIST: CPT | Mod: 26 | Performed by: SPECIALIST

## 2019-06-04 PROCEDURE — 47562 LAPAROSCOPIC CHOLECYSTECTOMY: CPT | Performed by: SPECIALIST

## 2019-06-04 PROCEDURE — 96375 TX/PRO/DX INJ NEW DRUG ADDON: CPT | Performed by: EMERGENCY MEDICINE

## 2019-06-04 PROCEDURE — 99285 EMERGENCY DEPT VISIT HI MDM: CPT | Mod: 25 | Performed by: EMERGENCY MEDICINE

## 2019-06-04 PROCEDURE — 85610 PROTHROMBIN TIME: CPT | Performed by: EMERGENCY MEDICINE

## 2019-06-04 PROCEDURE — 25000128 H RX IP 250 OP 636: Performed by: SPECIALIST

## 2019-06-04 PROCEDURE — 83690 ASSAY OF LIPASE: CPT | Performed by: EMERGENCY MEDICINE

## 2019-06-04 PROCEDURE — 37000008 ZZH ANESTHESIA TECHNICAL FEE, 1ST 30 MIN: Performed by: SPECIALIST

## 2019-06-04 PROCEDURE — 25000128 H RX IP 250 OP 636: Performed by: NURSE ANESTHETIST, CERTIFIED REGISTERED

## 2019-06-04 PROCEDURE — 88304 TISSUE EXAM BY PATHOLOGIST: CPT | Performed by: SPECIALIST

## 2019-06-04 PROCEDURE — 96361 HYDRATE IV INFUSION ADD-ON: CPT | Performed by: EMERGENCY MEDICINE

## 2019-06-04 PROCEDURE — 36000056 ZZH SURGERY LEVEL 3 1ST 30 MIN: Performed by: SPECIALIST

## 2019-06-04 PROCEDURE — 25000125 ZZHC RX 250: Performed by: NURSE ANESTHETIST, CERTIFIED REGISTERED

## 2019-06-04 PROCEDURE — 81025 URINE PREGNANCY TEST: CPT | Performed by: EMERGENCY MEDICINE

## 2019-06-04 PROCEDURE — 25000125 ZZHC RX 250: Performed by: SPECIALIST

## 2019-06-04 PROCEDURE — 85025 COMPLETE CBC W/AUTO DIFF WBC: CPT | Performed by: EMERGENCY MEDICINE

## 2019-06-04 PROCEDURE — 25800030 ZZH RX IP 258 OP 636: Performed by: NURSE ANESTHETIST, CERTIFIED REGISTERED

## 2019-06-04 PROCEDURE — 25000132 ZZH RX MED GY IP 250 OP 250 PS 637: Performed by: SPECIALIST

## 2019-06-04 PROCEDURE — 40000306 ZZH STATISTIC PRE PROC ASSESS II: Performed by: SPECIALIST

## 2019-06-04 PROCEDURE — 99244 OFF/OP CNSLTJ NEW/EST MOD 40: CPT | Mod: 57 | Performed by: SPECIALIST

## 2019-06-04 PROCEDURE — 25000566 ZZH SEVOFLURANE, EA 15 MIN: Performed by: SPECIALIST

## 2019-06-04 PROCEDURE — 81001 URINALYSIS AUTO W/SCOPE: CPT | Performed by: EMERGENCY MEDICINE

## 2019-06-04 PROCEDURE — 76705 ECHO EXAM OF ABDOMEN: CPT

## 2019-06-04 PROCEDURE — 71000027 ZZH RECOVERY PHASE 2 EACH 15 MINS: Performed by: SPECIALIST

## 2019-06-04 RX ORDER — SODIUM CHLORIDE, SODIUM LACTATE, POTASSIUM CHLORIDE, CALCIUM CHLORIDE 600; 310; 30; 20 MG/100ML; MG/100ML; MG/100ML; MG/100ML
INJECTION, SOLUTION INTRAVENOUS CONTINUOUS
Status: DISCONTINUED | OUTPATIENT
Start: 2019-06-04 | End: 2019-06-04 | Stop reason: HOSPADM

## 2019-06-04 RX ORDER — HYDROCODONE BITARTRATE AND ACETAMINOPHEN 5; 325 MG/1; MG/1
1-2 TABLET ORAL EVERY 6 HOURS PRN
Qty: 20 TABLET | Refills: 0 | Status: ON HOLD | OUTPATIENT
Start: 2019-06-04 | End: 2019-06-10

## 2019-06-04 RX ORDER — CEFAZOLIN SODIUM 1 G/3ML
1 INJECTION, POWDER, FOR SOLUTION INTRAMUSCULAR; INTRAVENOUS SEE ADMIN INSTRUCTIONS
Status: DISCONTINUED | OUTPATIENT
Start: 2019-06-04 | End: 2019-06-04 | Stop reason: HOSPADM

## 2019-06-04 RX ORDER — NALOXONE HYDROCHLORIDE 0.4 MG/ML
.1-.4 INJECTION, SOLUTION INTRAMUSCULAR; INTRAVENOUS; SUBCUTANEOUS
Status: DISCONTINUED | OUTPATIENT
Start: 2019-06-04 | End: 2019-06-04 | Stop reason: HOSPADM

## 2019-06-04 RX ORDER — OXYCODONE AND ACETAMINOPHEN 5; 325 MG/1; MG/1
2 TABLET ORAL
Status: COMPLETED | OUTPATIENT
Start: 2019-06-04 | End: 2019-06-04

## 2019-06-04 RX ORDER — PROCHLORPERAZINE MALEATE 5 MG
TABLET ORAL ONCE
Status: CANCELLED | OUTPATIENT
Start: 2019-06-04

## 2019-06-04 RX ORDER — ONDANSETRON 2 MG/ML
4 INJECTION INTRAMUSCULAR; INTRAVENOUS EVERY 30 MIN PRN
Status: DISCONTINUED | OUTPATIENT
Start: 2019-06-04 | End: 2019-06-04 | Stop reason: HOSPADM

## 2019-06-04 RX ORDER — GLYCOPYRROLATE 0.2 MG/ML
INJECTION, SOLUTION INTRAMUSCULAR; INTRAVENOUS PRN
Status: DISCONTINUED | OUTPATIENT
Start: 2019-06-04 | End: 2019-06-04

## 2019-06-04 RX ORDER — BUPIVACAINE HYDROCHLORIDE AND EPINEPHRINE 2.5; 5 MG/ML; UG/ML
INJECTION, SOLUTION INFILTRATION; PERINEURAL PRN
Status: DISCONTINUED | OUTPATIENT
Start: 2019-06-04 | End: 2019-06-04 | Stop reason: HOSPADM

## 2019-06-04 RX ORDER — METOCLOPRAMIDE HYDROCHLORIDE 5 MG/ML
10 INJECTION INTRAMUSCULAR; INTRAVENOUS EVERY 6 HOURS
Status: DISCONTINUED | OUTPATIENT
Start: 2019-06-04 | End: 2019-06-04 | Stop reason: HOSPADM

## 2019-06-04 RX ORDER — FENTANYL CITRATE 50 UG/ML
25-50 INJECTION, SOLUTION INTRAMUSCULAR; INTRAVENOUS
Status: DISCONTINUED | OUTPATIENT
Start: 2019-06-04 | End: 2019-06-04 | Stop reason: HOSPADM

## 2019-06-04 RX ORDER — OXYCODONE AND ACETAMINOPHEN 5; 325 MG/1; MG/1
1-2 TABLET ORAL EVERY 6 HOURS PRN
Qty: 20 TABLET | Refills: 0 | Status: SHIPPED | OUTPATIENT
Start: 2019-06-04 | End: 2023-04-27

## 2019-06-04 RX ORDER — ONDANSETRON 2 MG/ML
4 INJECTION INTRAMUSCULAR; INTRAVENOUS ONCE
Status: COMPLETED | OUTPATIENT
Start: 2019-06-04 | End: 2019-06-04

## 2019-06-04 RX ORDER — SCOLOPAMINE TRANSDERMAL SYSTEM 1 MG/1
1 PATCH, EXTENDED RELEASE TRANSDERMAL
Status: DISCONTINUED | OUTPATIENT
Start: 2019-06-04 | End: 2019-06-04 | Stop reason: HOSPADM

## 2019-06-04 RX ORDER — FENTANYL CITRATE 50 UG/ML
0.5 INJECTION, SOLUTION INTRAMUSCULAR; INTRAVENOUS EVERY 10 MIN PRN
Status: DISCONTINUED | OUTPATIENT
Start: 2019-06-04 | End: 2019-06-04 | Stop reason: CLARIF

## 2019-06-04 RX ORDER — ONDANSETRON 4 MG/1
4-8 TABLET, ORALLY DISINTEGRATING ORAL EVERY 8 HOURS PRN
Qty: 10 TABLET | Refills: 3 | Status: SHIPPED | OUTPATIENT
Start: 2019-06-04 | End: 2023-04-27

## 2019-06-04 RX ORDER — PROPOFOL 10 MG/ML
INJECTION, EMULSION INTRAVENOUS PRN
Status: DISCONTINUED | OUTPATIENT
Start: 2019-06-04 | End: 2019-06-04

## 2019-06-04 RX ORDER — HYDROXYZINE HYDROCHLORIDE 25 MG/1
25 TABLET, FILM COATED ORAL 3 TIMES DAILY PRN
Status: DISCONTINUED | OUTPATIENT
Start: 2019-06-04 | End: 2019-06-04 | Stop reason: HOSPADM

## 2019-06-04 RX ORDER — KETOROLAC TROMETHAMINE 30 MG/ML
30 INJECTION, SOLUTION INTRAMUSCULAR; INTRAVENOUS ONCE
Status: COMPLETED | OUTPATIENT
Start: 2019-06-04 | End: 2019-06-04

## 2019-06-04 RX ORDER — PROCHLORPERAZINE 25 MG
25 SUPPOSITORY, RECTAL RECTAL ONCE
Status: CANCELLED | OUTPATIENT
Start: 2019-06-04

## 2019-06-04 RX ORDER — MEPERIDINE HYDROCHLORIDE 25 MG/ML
12.5 INJECTION INTRAMUSCULAR; INTRAVENOUS; SUBCUTANEOUS
Status: DISCONTINUED | OUTPATIENT
Start: 2019-06-04 | End: 2019-06-04 | Stop reason: HOSPADM

## 2019-06-04 RX ORDER — FENTANYL CITRATE 50 UG/ML
INJECTION, SOLUTION INTRAMUSCULAR; INTRAVENOUS PRN
Status: DISCONTINUED | OUTPATIENT
Start: 2019-06-04 | End: 2019-06-04

## 2019-06-04 RX ORDER — DIMENHYDRINATE 50 MG/ML
INJECTION, SOLUTION INTRAMUSCULAR; INTRAVENOUS PRN
Status: DISCONTINUED | OUTPATIENT
Start: 2019-06-04 | End: 2019-06-04

## 2019-06-04 RX ORDER — SODIUM CHLORIDE 9 MG/ML
1000 INJECTION, SOLUTION INTRAVENOUS CONTINUOUS
Status: DISCONTINUED | OUTPATIENT
Start: 2019-06-04 | End: 2019-06-04 | Stop reason: HOSPADM

## 2019-06-04 RX ORDER — LIDOCAINE HYDROCHLORIDE 20 MG/ML
INJECTION, SOLUTION INFILTRATION; PERINEURAL PRN
Status: DISCONTINUED | OUTPATIENT
Start: 2019-06-04 | End: 2019-06-04

## 2019-06-04 RX ORDER — KETOROLAC TROMETHAMINE 30 MG/ML
INJECTION, SOLUTION INTRAMUSCULAR; INTRAVENOUS PRN
Status: DISCONTINUED | OUTPATIENT
Start: 2019-06-04 | End: 2019-06-04

## 2019-06-04 RX ORDER — ONDANSETRON 2 MG/ML
0.15 INJECTION INTRAMUSCULAR; INTRAVENOUS EVERY 30 MIN PRN
Status: DISCONTINUED | OUTPATIENT
Start: 2019-06-04 | End: 2019-06-04 | Stop reason: CLARIF

## 2019-06-04 RX ORDER — ONDANSETRON 4 MG/1
4 TABLET, ORALLY DISINTEGRATING ORAL EVERY 30 MIN PRN
Status: DISCONTINUED | OUTPATIENT
Start: 2019-06-04 | End: 2019-06-04 | Stop reason: HOSPADM

## 2019-06-04 RX ORDER — CEFAZOLIN SODIUM 2 G/100ML
2 INJECTION, SOLUTION INTRAVENOUS
Status: COMPLETED | OUTPATIENT
Start: 2019-06-04 | End: 2019-06-04

## 2019-06-04 RX ORDER — HYDROMORPHONE HYDROCHLORIDE 1 MG/ML
.3-.5 INJECTION, SOLUTION INTRAMUSCULAR; INTRAVENOUS; SUBCUTANEOUS EVERY 10 MIN PRN
Status: DISCONTINUED | OUTPATIENT
Start: 2019-06-04 | End: 2019-06-04 | Stop reason: HOSPADM

## 2019-06-04 RX ORDER — DEXAMETHASONE SODIUM PHOSPHATE 10 MG/ML
INJECTION, SOLUTION INTRAMUSCULAR; INTRAVENOUS PRN
Status: DISCONTINUED | OUTPATIENT
Start: 2019-06-04 | End: 2019-06-04

## 2019-06-04 RX ORDER — NEOSTIGMINE METHYLSULFATE 1 MG/ML
VIAL (ML) INJECTION PRN
Status: DISCONTINUED | OUTPATIENT
Start: 2019-06-04 | End: 2019-06-04

## 2019-06-04 RX ADMIN — CEFAZOLIN SODIUM 2 G: 2 INJECTION, SOLUTION INTRAVENOUS at 14:58

## 2019-06-04 RX ADMIN — GLYCOPYRROLATE 0.2 MG: 0.2 INJECTION, SOLUTION INTRAMUSCULAR; INTRAVENOUS at 15:58

## 2019-06-04 RX ADMIN — ONDANSETRON 4 MG: 2 INJECTION INTRAMUSCULAR; INTRAVENOUS at 11:42

## 2019-06-04 RX ADMIN — DIMENHYDRINATE 25 MG: 50 INJECTION, SOLUTION INTRAMUSCULAR; INTRAVENOUS at 15:48

## 2019-06-04 RX ADMIN — DEXAMETHASONE SODIUM PHOSPHATE 10 MG: 10 INJECTION, SOLUTION INTRAMUSCULAR; INTRAVENOUS at 15:13

## 2019-06-04 RX ADMIN — SODIUM CHLORIDE 1000 ML: 9 INJECTION, SOLUTION INTRAVENOUS at 11:39

## 2019-06-04 RX ADMIN — MIDAZOLAM 2 MG: 1 INJECTION INTRAMUSCULAR; INTRAVENOUS at 14:53

## 2019-06-04 RX ADMIN — SODIUM CHLORIDE, POTASSIUM CHLORIDE, SODIUM LACTATE AND CALCIUM CHLORIDE: 600; 310; 30; 20 INJECTION, SOLUTION INTRAVENOUS at 15:42

## 2019-06-04 RX ADMIN — LIDOCAINE HYDROCHLORIDE 80 MG: 20 INJECTION, SOLUTION INFILTRATION; PERINEURAL at 14:59

## 2019-06-04 RX ADMIN — OXYCODONE HYDROCHLORIDE AND ACETAMINOPHEN 1 TABLET: 5; 325 TABLET ORAL at 18:43

## 2019-06-04 RX ADMIN — SODIUM CHLORIDE, POTASSIUM CHLORIDE, SODIUM LACTATE AND CALCIUM CHLORIDE: 600; 310; 30; 20 INJECTION, SOLUTION INTRAVENOUS at 14:49

## 2019-06-04 RX ADMIN — GLYCOPYRROLATE 0.2 MG: 0.2 INJECTION, SOLUTION INTRAMUSCULAR; INTRAVENOUS at 15:00

## 2019-06-04 RX ADMIN — ONDANSETRON 4 MG: 2 INJECTION INTRAMUSCULAR; INTRAVENOUS at 14:55

## 2019-06-04 RX ADMIN — PROPOFOL 150 MG: 10 INJECTION, EMULSION INTRAVENOUS at 14:59

## 2019-06-04 RX ADMIN — KETOROLAC TROMETHAMINE 30 MG: 30 INJECTION, SOLUTION INTRAMUSCULAR at 15:54

## 2019-06-04 RX ADMIN — PROCHLORPERAZINE EDISYLATE 10 MG: 5 INJECTION INTRAMUSCULAR; INTRAVENOUS at 19:30

## 2019-06-04 RX ADMIN — FENTANYL CITRATE 100 MCG: 50 INJECTION, SOLUTION INTRAMUSCULAR; INTRAVENOUS at 14:59

## 2019-06-04 RX ADMIN — ROCURONIUM BROMIDE 30 MG: 10 INJECTION INTRAVENOUS at 15:00

## 2019-06-04 RX ADMIN — Medication 2.5 MG: at 15:58

## 2019-06-04 RX ADMIN — KETOROLAC TROMETHAMINE 30 MG: 30 INJECTION, SOLUTION INTRAMUSCULAR at 11:40

## 2019-06-04 RX ADMIN — HYDROMORPHONE HYDROCHLORIDE 0.5 MG: 1 INJECTION, SOLUTION INTRAMUSCULAR; INTRAVENOUS; SUBCUTANEOUS at 16:24

## 2019-06-04 RX ADMIN — SCOPALAMINE 1 PATCH: 1 PATCH, EXTENDED RELEASE TRANSDERMAL at 14:53

## 2019-06-04 RX ADMIN — SODIUM CHLORIDE, POTASSIUM CHLORIDE, SODIUM LACTATE AND CALCIUM CHLORIDE: 600; 310; 30; 20 INJECTION, SOLUTION INTRAVENOUS at 14:57

## 2019-06-04 RX ADMIN — ONDANSETRON 4 MG: 2 INJECTION INTRAMUSCULAR; INTRAVENOUS at 14:02

## 2019-06-04 ASSESSMENT — MIFFLIN-ST. JEOR: SCORE: 1485.34

## 2019-06-04 NOTE — ED TRIAGE NOTES
Ere with nausea, vomiting and abd pain. States this episode started about 30 minutes ago. She has had similar episodes- the last one was about 2 weeks ago.

## 2019-06-04 NOTE — ED PROVIDER NOTES
"  History     Chief Complaint   Patient presents with     Abdominal Pain     HPI  Stephania Barahona is a 39 year old female who presents with complaints of right upper quadrant abdominal pain and nausea without emesis.  Patient states she has had this problem in the past and over the last week or so she has had multiple episodes.  This episode began about 1/2-hour prior to arrival.  She denies any fever but has felt chilled.  Denies chest pain or shortness of breath.  Is not had a cough.  She admits to nonbloody diarrhea \"all of the time\".  She had a history of some acid issues and was on a PPI in the past.  Currently on no meds for this.  About a year ago she was seen in the emergency room for the same and had an ultrasound showing cholelithiasis without evidence of cholecystitis.  Patient states she was unaware that she had some gallstones.  She has had no abdominal surgeries.  She doubts she is pregnant.  She has polycystic ovary disease and states her periods are quite irregular and they try to get pregnant for over 12 years without success.  Is never had a colonoscopy.  Denies family history of bowel issues.  Treatment for her pain prior to arrival.  Patient is a daily smoker.  No fall or injury.  No rash over the affected area.  Denies urinary symptoms.  History of kidney stone.    Allergies:  Allergies   Allergen Reactions     Clindamycin/Lincomycin Nausea     Nubain [Nalbuphine Hcl] Nausea and Vomiting     And dizziness       Problem List:    Patient Active Problem List    Diagnosis Date Noted     Hypertriglyceridemia 01/27/2015     Priority: Medium     CARDIOVASCULAR SCREENING; LDL GOAL LESS THAN 160 10/31/2010     Priority: Medium     Female infertility 09/30/2009     Priority: Medium     Problem list name updated by automated process. Provider to review       Closed fracture of lumbar vertebra (H) 12/24/2007     Priority: Medium     Problem list name updated by automated process. Provider to review       " Degeneration of lumbar or lumbosacral intervertebral disc 12/24/2007     Priority: Medium        Past Medical History:    Past Medical History:   Diagnosis Date     NO ACTIVE PROBLEMS        Past Surgical History:    Past Surgical History:   Procedure Laterality Date     C NONSPECIFIC PROCEDURE  2007    lumbar fracture     D & C  03/25/11     TONSILLECTOMY  1991       Family History:    Family History   Problem Relation Age of Onset     Asthma Sister      Diabetes Sister         gestational diabetes     Asthma Sister      Asthma Brother      Psychotic Disorder Maternal Grandfather      Depression Maternal Grandfather      Alcohol/Drug Maternal Grandfather      Alzheimer Disease Paternal Grandfather         Dementia       Social History:  Marital Status:   [2]  Social History     Tobacco Use     Smoking status: Current Every Day Smoker     Smokeless tobacco: Never Used     Tobacco comment: 2004   Substance Use Topics     Alcohol use: No     Comment: rarely     Drug use: No        Medications:      cyclobenzaprine (FLEXERIL) 10 MG tablet         Review of Systems all other systems reviewed and are negative.    Physical Exam   BP: 108/70  Pulse: 89  Heart Rate: 98  Temp: 97.6  F (36.4  C)  Resp: 14  SpO2: 98 %      Physical Exam General alert cooperative female who looks moderate distress.  HEENT shows no scleral icterus.  Oral mucosa moist.  Speech is clear.  Neck is supple.  Lungs are clear without adventitious sounds.  No CVA tenderness.  Normal cardiac auscultation.  Abdomen reveals active bowel sounds and on palpation she has tenderness in the midepigastrium right upper quadrant.  No guarding or rebound.  No pitting edema in the extremities.  No calf or thigh pain.  Negative Homans.  No skin rash over the flank or abdomen.    ED Course        Procedures               Critical Care time:  none               Results for orders placed or performed during the hospital encounter of 06/04/19 (from the past 24  hour(s))   US Abdomen Limited    Narrative    LIMITED ABDOMINAL ULTRASOUND  6/4/2019 11:28 AM     HISTORY:  Postprandial right upper quadrant pain with previous history  of cholelithiasis.    COMPARISON: Limited abdominal ultrasound dated 7/7/2018.    FINDINGS:    Gallbladder: Multiple small gallstones are again noted with posterior  shadowing. These measure up to approximately 0.6 cm. The gallbladder  wall is upper normal thickness measuring between 0.3 and 0.4 cm at its  thickest portion. There is a positive sonographic Pinto's sign but no  pericholecystic fluid. Common bile duct is upper normal size at 0.6  cm.    Bile ducts:  CHD is normal diameter.  No intrahepatic biliary  dilatation.    Liver: Normal.     Pancreas:  Partially obscured but grossly unremarkable, where seen.     Right kidney:  Normal.     Aorta and Proximal IVC: Normal.      Impression    IMPRESSION: Cholelithiasis with sonographic Pinto sign and top normal  gallbladder wall thickness could represent acute cholecystitis. No  pericholecystic fluid.   CBC with platelets differential   Result Value Ref Range    WBC 8.8 4.0 - 11.0 10e9/L    RBC Count 4.75 3.8 - 5.2 10e12/L    Hemoglobin 14.4 11.7 - 15.7 g/dL    Hematocrit 42.4 35.0 - 47.0 %    MCV 89 78 - 100 fl    MCH 30.3 26.5 - 33.0 pg    MCHC 34.0 31.5 - 36.5 g/dL    RDW 12.0 10.0 - 15.0 %    Platelet Count 281 150 - 450 10e9/L    Diff Method Automated Method     % Neutrophils 76.3 %    % Lymphocytes 15.9 %    % Monocytes 6.0 %    % Eosinophils 1.1 %    % Basophils 0.5 %    % Immature Granulocytes 0.2 %    Nucleated RBCs 0 0 /100    Absolute Neutrophil 6.7 1.6 - 8.3 10e9/L    Absolute Lymphocytes 1.4 0.8 - 5.3 10e9/L    Absolute Monocytes 0.5 0.0 - 1.3 10e9/L    Absolute Basophils 0.0 0.0 - 0.2 10e9/L    Abs Immature Granulocytes 0.0 0 - 0.4 10e9/L    Absolute Nucleated RBC 0.0    INR   Result Value Ref Range    INR 0.94 0.86 - 1.14   Comprehensive metabolic panel   Result Value Ref Range     Sodium 143 133 - 144 mmol/L    Potassium 3.8 3.4 - 5.3 mmol/L    Chloride 107 94 - 109 mmol/L    Carbon Dioxide 32 20 - 32 mmol/L    Anion Gap 4 3 - 14 mmol/L    Glucose 99 70 - 99 mg/dL    Urea Nitrogen 8 7 - 30 mg/dL    Creatinine 0.85 0.52 - 1.04 mg/dL    GFR Estimate 87 >60 mL/min/[1.73_m2]    GFR Estimate If Black >90 >60 mL/min/[1.73_m2]    Calcium 8.7 8.5 - 10.1 mg/dL    Bilirubin Total 0.4 0.2 - 1.3 mg/dL    Albumin 3.7 3.4 - 5.0 g/dL    Protein Total 6.8 6.8 - 8.8 g/dL    Alkaline Phosphatase 98 40 - 150 U/L    ALT 31 0 - 50 U/L    AST 82 (H) 0 - 45 U/L   Lipase   Result Value Ref Range    Lipase 213 73 - 393 U/L   UA reflex to Microscopic   Result Value Ref Range    Color Urine Yellow     Appearance Urine Cloudy     Glucose Urine Negative NEG^Negative mg/dL    Bilirubin Urine Negative NEG^Negative    Ketones Urine Negative NEG^Negative mg/dL    Specific Gravity Urine 1.015 1.003 - 1.035    Blood Urine Negative NEG^Negative    pH Urine 7.0 5.0 - 7.0 pH    Protein Albumin Urine Negative NEG^Negative mg/dL    Urobilinogen mg/dL 0.0 0.0 - 2.0 mg/dL    Nitrite Urine Negative NEG^Negative    Leukocyte Esterase Urine Negative NEG^Negative    Source Midstream Urine     RBC Urine 0 0 - 2 /HPF    WBC Urine 0 0 - 5 /HPF    Bacteria Urine Few (A) NEG^Negative /HPF    Squamous Epithelial /HPF Urine 27 (H) 0 - 1 /HPF    Mucous Urine Present (A) NEG^Negative /LPF   HCG qualitative urine (UPT)   Result Value Ref Range    HCG Qual Urine Negative NEG^Negative       Medications   0.9% sodium chloride BOLUS (0 mLs Intravenous Stopped 6/4/19 1241)     Followed by   sodium chloride 0.9% infusion (has no administration in time range)   ondansetron (ZOFRAN) injection 4 mg (4 mg Intravenous Given 6/4/19 1142)   ketorolac (TORADOL) injection 30 mg (30 mg Intravenous Given 6/4/19 1140)     When patient returns from ultrasound she is still having right upper quadrant and midepigastric pain.  She thought her symptoms are better but on  "exam she  over the gallbladder.  Because results of blood work and ultrasound with concerns for cholecystitis.  I then spoke with Dr. Smith he will take the patient to the OR for cholecystectomy today.  Patient would like to proceed.  Assessments & Plan (with Medical Decision Making)   Stephania Barahona is a 39 year old female who presents with complaints of right upper quadrant abdominal pain and nausea without emesis.  Patient states she has had this problem in the past and over the last week or so she has had multiple episodes.  This episode began about 1/2-hour prior to arrival.  She denies any fever but has felt chilled.  Denies chest pain or shortness of breath.  Is not had a cough.  She admits to nonbloody diarrhea \"all of the time\".  She had a history of some acid issues and was on a PPI in the past.  Currently on no meds for this.  About a year ago she was seen in the emergency room for the same and had an ultrasound showing cholelithiasis without evidence of cholecystitis.  Patient states she was unaware that she had some gallstones.  She has had no abdominal surgeries.  She doubts she is pregnant.  She has polycystic ovary disease and states her periods are quite irregular and they try to get pregnant for over 12 years without success.  Is never had a colonoscopy.  Denies family history of bowel issues.  Treatment for her pain prior to arrival.  Patient is a daily smoker.  No fall or injury.  No rash over the affected area.  Denies urinary symptoms.  History of kidney stone.  On presentation patient was afebrile vitally stable.  She was tender in the midepigastrium right upper quadrant.  Patient that she was improved with IV pain meds and antiemetics but on exam she was  in the right upper quadrant midepigastrium.  Blood work was unremarkable except her AST was 82.  An ultrasound was performed and it showed gallbladder wall thickening with gallstones.  Common duct was normal.  " Positive Pinto sign but no pericolic fluid.  Spoke with Dr. Smith from surgery and he will plan to take the patient to OR today for laparoscopic cholecystectomy.      My ER note may be used for medical clearance for surgery.        I have reviewed the nursing notes.    I have reviewed the findings, diagnosis, plan and need for follow up with the patient.          Medication List      There are no discharge medications for this visit.         Final diagnoses:   Abdominal pain, right upper quadrant   Cholecystitis       6/4/2019   [facility] EMERGENCY DEPARTMENT     Sunny Gustafson MD  06/04/19 1310       Sunny Gustafson MD  06/04/19 132

## 2019-06-04 NOTE — ANESTHESIA POSTPROCEDURE EVALUATION
Patient: Stephania Barahona    Procedure(s):  CHOLECYSTECTOMY, LAPAROSCOPIC    Diagnosis:gallstones  Diagnosis Additional Information: No value filed.    Anesthesia Type:  General    Note:  Anesthesia Post Evaluation    Patient location during evaluation: Phase 2 and Bedside  Patient participation: Able to fully participate in evaluation  Level of consciousness: awake  Pain management: adequate  Airway patency: patent  Cardiovascular status: acceptable and hemodynamically stable  Respiratory status: acceptable, room air and nonlabored ventilation  Hydration status: stable  PONV: none     Anesthetic complications: None    Comments: Patient was happy with the anesthesia care received and no anesthesia related complications were noted.  I will follow up with the patient again if it is needed.        Last vitals:  Vitals:    06/04/19 1745 06/04/19 1752 06/04/19 1800   BP: (!) 122/91 (!) 122/91 129/89   Pulse: 88     Resp: 12 14 12   Temp: 97.3  F (36.3  C) 97.5  F (36.4  C) 97.7  F (36.5  C)   SpO2: 100% 100% 98%         Electronically Signed By: LINDA Stafford CRNA  June 4, 2019  6:08 PM

## 2019-06-04 NOTE — PROGRESS NOTES
Surgery    Pt with probable cholecystitis vs biliary colic.  She does not want surgery now - changed mind since ER.  Wants to finish work Thursday then try for next week.  I reviewed the risks of this with the patient and she expressed understanding.   The procedure, risks, benefits, and alternatives were discussed and she expressed understanding.  She is going to think about what she wants to do.  If she leaves will be given an appt to f/u with one of the [facility] for this week.    Jaiden Smith MD, FACS      Addendum:  Pt now decided she wants to proceed. With Surgery.

## 2019-06-04 NOTE — ANESTHESIA PREPROCEDURE EVALUATION
Anesthesia Pre-Procedure Evaluation    Patient: Stephania Barahona   MRN: [id number] : 1980          Preoperative Diagnosis: gallstones    Procedure(s):  CHOLECYSTECTOMY, LAPAROSCOPIC    Past Medical History:   Diagnosis Date     NO ACTIVE PROBLEMS      Past Surgical History:   Procedure Laterality Date     C NONSPECIFIC PROCEDURE  2007    lumbar fracture     D & C  11     TONSILLECTOMY         Anesthesia Evaluation     .             ROS/MED HX    ENT/Pulmonary:  - neg pulmonary ROS     Neurologic:  - neg neurologic ROS     Cardiovascular:  - neg cardiovascular ROS       METS/Exercise Tolerance:     Hematologic:  - neg hematologic  ROS       Musculoskeletal:  - neg musculoskeletal ROS       GI/Hepatic:  - neg GI/hepatic ROS       Renal/Genitourinary:  - ROS Renal section negative       Endo:  - neg endo ROS       Psychiatric:  - neg psychiatric ROS       Infectious Disease:  - neg infectious disease ROS       Malignancy:      - no malignancy   Other:    (+) No chance of pregnancy C-spine cleared: N/A, no H/O Chronic Pain,no other significant disability                         Physical Exam  Normal systems: cardiovascular, pulmonary and dental    Airway   Mallampati: I  TM distance: <3 FB    Dental     Cardiovascular       Pulmonary             Lab Results   Component Value Date    WBC 8.8 2019    HGB 14.4 2019    HCT 42.4 2019     2019    CRP <2.9 2017     2019    POTASSIUM 3.8 2019    CHLORIDE 107 2019    CO2 32 2019    BUN 8 2019    CR 0.85 2019    GLC 99 2019    TAMIA 8.7 2019    ALBUMIN 3.7 2019    PROTTOTAL 6.8 2019    ALT 31 2019    AST 82 (H) 2019    ALKPHOS 98 2019    BILITOTAL 0.4 2019    LIPASE 213 2019    INR 0.94 2019    TSH 1.48 10/11/2017    HCG Negative 2019       Preop Vitals  BP Readings from Last 3 Encounters:   19 (!) 122/91   18  "110/68   07/08/18 132/67    Pulse Readings from Last 3 Encounters:   06/04/19 107   07/12/18 112   07/08/18 68      Resp Readings from Last 3 Encounters:   06/04/19 14   07/12/18 20   07/08/18 18    SpO2 Readings from Last 3 Encounters:   06/04/19 99%   07/12/18 98%   07/07/18 99%      Temp Readings from Last 1 Encounters:   06/04/19 97.6  F (36.4  C) (Oral)    Ht Readings from Last 1 Encounters:   07/12/18 1.638 m (5' 4.5\")      Wt Readings from Last 1 Encounters:   07/12/18 80.3 kg (177 lb)    Estimated body mass index is 29.91 kg/m  as calculated from the following:    Height as of 7/12/18: 1.638 m (5' 4.5\").    Weight as of 7/12/18: 80.3 kg (177 lb).       Anesthesia Plan      History & Physical Review  History and physical reviewed and following examination; no interval change.    ASA Status:  2 .    NPO Status:  > 6 hours    Plan for General with Intravenous and Propofol induction. Maintenance will be Inhalation and Balanced.    PONV prophylaxis:  Ondansetron (or other 5HT-3), Dexamethasone or Solumedrol and Scopolamine patch       Postoperative Care  Postoperative pain management:  IV analgesics and Oral pain medications.      Consents  Anesthetic plan, risks, benefits and alternatives discussed with:  Patient..                 LINDA GARRETT CRNA  "

## 2019-06-04 NOTE — BRIEF OP NOTE
[facility] Brief Operative Note    Pre-operative diagnosis: Acute Cholecystitis   Post-operative diagnosis Same   Procedure: Procedure(s):  CHOLECYSTECTOMY, LAPAROSCOPIC   Surgeon(s): Surgeon(s) and Role:     * Jaiden Smith MD - Primary   Estimated blood loss: * No values recorded between 6/4/2019  3:16 PM and 6/4/2019  4:04 PM *    Specimens: ID Type Source Tests Collected by Time Destination   A : Gallbladder and contents Tissue Gallbladder and Contents SURGICAL PATHOLOGY EXAM Jaiden Smith MD 6/4/2019  3:27 PM       Findings: Distended, thickened gallbladder with hydrops and stones       Jaiden Smith MD, FACS    #[id number]

## 2019-06-04 NOTE — CONSULTS
[facility] Emergency Department Surgery Consult    Stephania Barahona MRN# [id number]   Age: 39 year old YOB: 1980     Date of Admission:  6/4/2019    Reason for consult: Acute cholecystitis       Requesting physician: Dr. Gustafson       Level of consult: Consult, follow and place orders           Impression and Plan:   Impression:   Acute cholecystitis      Plan:   Proceed to lap cholecystectomy.   The procedure, risks, benefits, and alternatives were discussed and the patient agrees to proceed.             Chief Complaint:   gallstones  Abdominal pain, epigastric  Abdominal pain, right upper quadrant     History is obtained from the patient         History of Present Illness:   This 39 year old female stenting with a several year history of intermittent epigastric pain.  She was seen in the ER about a year ago for it and was found to have gallstones.  Over the past week she has had a dull ache in the right upper quadrant that became severe this morning after eating grilled cheese.  She presented to the ER and a subsequent ultrasound the ER was concerning for an early cholecystitis which I am now asked to see her.  Currently she is resting comfortably on the bed and initially she had some concerns about surgery but would now like to proceed.           Past Medical History:     Past Medical History:   Diagnosis Date     NO ACTIVE PROBLEMS              Past Surgical History:     Past Surgical History:   Procedure Laterality Date     C NONSPECIFIC PROCEDURE  2007    lumbar fracture     D & C  03/25/11     TONSILLECTOMY  1991             Social History:     Social History     Tobacco Use     Smoking status: Current Every Day Smoker     Smokeless tobacco: Never Used     Tobacco comment: 2004   Substance Use Topics     Alcohol use: No     Comment: rarely             Family History:     Family History   Problem Relation Age of Onset     Asthma Sister      Diabetes Sister         gestational diabetes      Asthma Sister      Asthma Brother      Psychotic Disorder Maternal Grandfather      Depression Maternal Grandfather      Alcohol/Drug Maternal Grandfather      Alzheimer Disease Paternal Grandfather         Dementia              Allergies:     Allergies   Allergen Reactions     Clindamycin/Lincomycin Nausea     Nubain [Nalbuphine Hcl] Nausea and Vomiting     And dizziness             Medications:     Current Facility-Administered Medications   Medication     lactated ringers infusion     ondansetron (ZOFRAN) injection 4 mg     sodium chloride 0.9% infusion     Current Outpatient Medications   Medication Sig     cyclobenzaprine (FLEXERIL) 10 MG tablet TAKE 1/2 - 1 TABLET EVERY NIGHT AT BEDTIME             Review of Systems:   The review of systems was positive for the following findings.  Abdomen.  The remainder of the review of systems was unremarkable.          Physical Exam:   PE:  B/P: 122/91, T: 97.6, P: 107, R: 14  General: well developed, well nourished EF who appears their stated age  HEENT: NC/AT, EOMI, (-)icterus, (-)injection  Neck: Supple, No JVD  Chest: CTA  Heart: S1, S2, (-)m/r/g  Abd: Soft, non distended,  no masses, RUQ/epigastric tenderness with guarding, (+)[place] sign  Ext; Warm, no edema  Psych: AAOx3  Neuro: No focal deficits            Data:   All laboratory data reviewed  Results for orders placed or performed during the hospital encounter of 06/04/19 (from the past 24 hour(s))   US Abdomen Limited    Narrative    LIMITED ABDOMINAL ULTRASOUND  6/4/2019 11:28 AM     HISTORY:  Postprandial right upper quadrant pain with previous history  of cholelithiasis.    COMPARISON: Limited abdominal ultrasound dated 7/7/2018.    FINDINGS:    Gallbladder: Multiple small gallstones are again noted with posterior  shadowing. These measure up to approximately 0.6 cm. The gallbladder  wall is upper normal thickness measuring between 0.3 and 0.4 cm at its  thickest portion. There is a positive sonographic  Pinto's sign but no  pericholecystic fluid. Common bile duct is upper normal size at 0.6  cm.    Bile ducts:  CHD is normal diameter.  No intrahepatic biliary  dilatation.    Liver: Normal.     Pancreas:  Partially obscured but grossly unremarkable, where seen.     Right kidney:  Normal.     Aorta and Proximal IVC: Normal.      Impression    IMPRESSION: Cholelithiasis with sonographic Pinto sign and top normal  gallbladder wall thickness could represent acute cholecystitis. No  pericholecystic fluid.   CBC with platelets differential   Result Value Ref Range    WBC 8.8 4.0 - 11.0 10e9/L    RBC Count 4.75 3.8 - 5.2 10e12/L    Hemoglobin 14.4 11.7 - 15.7 g/dL    Hematocrit 42.4 35.0 - 47.0 %    MCV 89 78 - 100 fl    MCH 30.3 26.5 - 33.0 pg    MCHC 34.0 31.5 - 36.5 g/dL    RDW 12.0 10.0 - 15.0 %    Platelet Count 281 150 - 450 10e9/L    Diff Method Automated Method     % Neutrophils 76.3 %    % Lymphocytes 15.9 %    % Monocytes 6.0 %    % Eosinophils 1.1 %    % Basophils 0.5 %    % Immature Granulocytes 0.2 %    Nucleated RBCs 0 0 /100    Absolute Neutrophil 6.7 1.6 - 8.3 10e9/L    Absolute Lymphocytes 1.4 0.8 - 5.3 10e9/L    Absolute Monocytes 0.5 0.0 - 1.3 10e9/L    Absolute Basophils 0.0 0.0 - 0.2 10e9/L    Abs Immature Granulocytes 0.0 0 - 0.4 10e9/L    Absolute Nucleated RBC 0.0    INR   Result Value Ref Range    INR 0.94 0.86 - 1.14   Comprehensive metabolic panel   Result Value Ref Range    Sodium 143 133 - 144 mmol/L    Potassium 3.8 3.4 - 5.3 mmol/L    Chloride 107 94 - 109 mmol/L    Carbon Dioxide 32 20 - 32 mmol/L    Anion Gap 4 3 - 14 mmol/L    Glucose 99 70 - 99 mg/dL    Urea Nitrogen 8 7 - 30 mg/dL    Creatinine 0.85 0.52 - 1.04 mg/dL    GFR Estimate 87 >60 mL/min/[1.73_m2]    GFR Estimate If Black >90 >60 mL/min/[1.73_m2]    Calcium 8.7 8.5 - 10.1 mg/dL    Bilirubin Total 0.4 0.2 - 1.3 mg/dL    Albumin 3.7 3.4 - 5.0 g/dL    Protein Total 6.8 6.8 - 8.8 g/dL    Alkaline Phosphatase 98 40 - 150 U/L    ALT  31 0 - 50 U/L    AST 82 (H) 0 - 45 U/L   Lipase   Result Value Ref Range    Lipase 213 73 - 393 U/L   UA reflex to Microscopic   Result Value Ref Range    Color Urine Yellow     Appearance Urine Cloudy     Glucose Urine Negative NEG^Negative mg/dL    Bilirubin Urine Negative NEG^Negative    Ketones Urine Negative NEG^Negative mg/dL    Specific Gravity Urine 1.015 1.003 - 1.035    Blood Urine Negative NEG^Negative    pH Urine 7.0 5.0 - 7.0 pH    Protein Albumin Urine Negative NEG^Negative mg/dL    Urobilinogen mg/dL 0.0 0.0 - 2.0 mg/dL    Nitrite Urine Negative NEG^Negative    Leukocyte Esterase Urine Negative NEG^Negative    Source Midstream Urine     RBC Urine 0 0 - 2 /HPF    WBC Urine 0 0 - 5 /HPF    Bacteria Urine Few (A) NEG^Negative /HPF    Squamous Epithelial /HPF Urine 27 (H) 0 - 1 /HPF    Mucous Urine Present (A) NEG^Negative /LPF   HCG qualitative urine (UPT)   Result Value Ref Range    HCG Qual Urine Negative NEG^Negative     All imaging studies reviewed by me.     Jaiden Smith MD, FACS

## 2019-06-04 NOTE — ANESTHESIA CARE TRANSFER NOTE
Patient: Stephania Barahona    Procedure(s):  CHOLECYSTECTOMY, LAPAROSCOPIC    Diagnosis: gallstones  Diagnosis Additional Information: No value filed.    Anesthesia Type:   General     Note:  Airway :Blow-by  Patient transferred to:PACU  Handoff Report: Identifed the Patient, Identified the Reponsible Provider, Reviewed the pertinent medical history, Discussed the surgical course, Reviewed Intra-OP anesthesia mangement and issues during anesthesia, Set expectations for post-procedure period and Allowed opportunity for questions and acknowledgement of understanding      Vitals: (Last set prior to Anesthesia Care Transfer)    CRNA VITALS  6/4/2019 1554 - 6/4/2019 1644      6/4/2019             Pulse:  100    SpO2:  100 %    Resp Rate (observed):  17                Electronically Signed By: LINDA Stafford CRNA  June 4, 2019  4:44 PM

## 2019-06-04 NOTE — DISCHARGE INSTRUCTIONS
[facility]    Home Care Following Gallbladder Surgery    Dr. Smith      Care of the Incision:    Remove gauze dressing (if present) after 24 hours and then ok to shower.    Leave small strips in place (if present).  They will gradually come off.    Surgical glue was used on your incision, keep it dry for 24 hours.  Then you may shower but don t submerge under water for at least 2 weeks.  Gently pat your incision dry with a freshly laundered towel.    Do not touch your incision with bare hands or pick at scabs.    Leave your incision open to air.  Cover it only if draining, clothing rubs or irritates it.  Pain management:  You have been prescribed Percocet to help manage your pain.  This medication does contain Tylenol/Acetaminophen.  You should not add in Tylenol/Acetaminophen if you are taking 2 pain pills every 4-6 hours.  Once you are weaning off the pain pills, you may substitute Tylenol/Acetaminophen for the narcotic pain pills.  The max dose of Tylenol/Acetaminophen is 4000 mg in 24 hours.    The over the counter dosage for Tylenol/Acetaminophen is 1-2 tabs (325 mg each) every 4 to 6 hours.     If you tolerate Ibuprofen, Motrin or Advil, take 600 mg every 6 hours.  You may start your 1st home dose of Ibuprofen at 10:00 PM tonight.    Activity:    Gradually increase your activity.  Walk short distances several times each day and increase the distance as your strength allows.    To promote circulation, do not cross your legs while sitting.    Return to work will be determined by the type of work you do and should be discussed with your physician.    Do not drive or operate equipment while taking prescription pain medicines.  You may drive 1 week after surgery if you have stopped taking prescription pain medicines and can react quickly enough to make an emergency stop if necessary.    Diet:    Return to the diet you were on before surgery.    Drink plenty of water.    Avoid foods that cause  constipation.    REMEMBER--most prescription pain pills cause constipation.  Walking, extra fluids, and increased fiber (fresh fruits and vegetables, etc.) are natural remedies for constipation.  You can also take mineral oil, 1-2 Tablespoons per day.  If still constipated you may try a stool softener such as Colace or Miralax.  DISCHARGE INSTRUCTIONS FOR PATIENT   SCOPOLAMINE TRANSDERMAL PATCH  You may leave the patch on behind your ear for up to three days (72 hours), but NO LONGER. You may have withdrawal symptoms (nausea, vomiting, headache, dizziness) if used longer.  When you remove the patch, you should wash and dry your hands thoroughly and before touching your eyes, as pupil may dilate if the medication gets in your eye.  Discard patch (away from children and pets).  You may develop some urinary hesitancy or urine retention.    Call Your Physician if You Have:  [phone]    Redness, increased swelling or cloudy drainage from your incision.    A temperature of more than 101 degrees F.    Worsening pain in your incision not relieved by your prescription pain pills and/or a short rest.    Jaundice (yellowing of skin or eyes)    Abdominal distention (stomach getting very large)    Swelling in your legs    Productive cough    Burning with urination    Any questions or concerns about your recovery, please call         Business hours [phone]    After hours [phone] Nurse Advice Line (24 hours a day)    Follow-up Care:    Make an appointment 7-10 days after your surgery for a surgery follow up with Dr. Smith.  Call tomorrow (Wednesday) [phone].        Wound Healing and Care of the Incision    Your recovery will be much quicker since you don t have a large abdominal incision.  During laparoscopic surgery, gas is put into your abdomen to lift the abdominal wall up and away from the operative site.  Before the end of your surgery, the doctor rinses the area with a sterile liquid.  Most, but not  all of this gas is removed before your surgery ends.  Your body will absorb the rest.  You may experience pain in the shoulder areas or a bloated abdomen because of the gas.      Listen to what your body is telling you.  If any activity hurts: STOP and try it again in a few days.    Many patients report feeling more tired the second week after surgery.  This is most likely related to the healing process.    Returning to work will depend on how you feel and the type of work that you do and should be discussed with your doctor at your follow up appointment.  Most people return to work within 1-2 weeks of surgery.    [facility] Same-Day Surgery Anesthesia  Adult Discharge Orders & Instructions     For 24 hours after surgery    1. Get plenty of rest.  A responsible adult must stay with you for at least 24 hours after you leave the hospital.   2. Do not drive or use heavy equipment.  If you have weakness or tingling, don't drive or use heavy equipment until this feeling goes away.  3. Do not drink alcohol.  4. Avoid strenuous or risky activities.  Ask for help when climbing stairs.   5. You may feel lightheaded.  If so, sit for a few minutes before standing.  Have someone help you get up.   6. You may have a slight fever. Call the doctor if your fever is over 100 F (37.7 C) (taken under the tongue) or lasts longer than 24 hours.  7. You may have a dry mouth, a sore throat, muscle aches or trouble sleeping.  These should go away after 24 hours.  8. Do not make important or legal decisions.  We don t expect you to have any problems from the surgery or treatment you had today. Just in case, here s what to do if you have pain, upset stomach (nausea), bleeding or infection:  Pain:  Take medicines your doctor has prescribed or over-the-counter medicine they have suggested. Resting and using ice packs can help, too. For surgery on an arm or leg, raise it on a pillow to ease swelling. Call your doctor if these methods  don t work.  Copyright Artie Nino, Licensed under CC4.0 [employer]  Upset stomach (nausea):  Take anti-nausea medicine approved by your doctor. Drink clear liquids like apple juice, ginger ale, broth or 7-Up. Be sure to drink enough fluids. Rest can help, too. Move to normal foods when you re ready.   Bleeding:  In the first 24 hours, you may see a little blood on your dressing, about the size of a quarter. You don t need to worry about this much blood, but if the blood spot keeps getting bigger:    Put pressure on the wound if you can, AND    Call your doctor.  Copyright [employer], Licensed under CC4.0 [employer]  Fever/Infection: Please call your doctor if you have any of these signs:    Redness    Swelling    Wound feels warm    Pain gets worse    Bad-smelling fluid leaks from wound    Fever or chills  Call your doctor for any of the followin.  It has been over 8 to 10 hours since surgery and you are still not able to urinate (pass water).    2.  Headache for over 24 hours.      Nurse advice line: [phone]

## 2019-06-05 NOTE — OR NURSING
S: Patient complains of being nauseated.   has brought patient [employer] sandwich to eat.  As writing nurse walks in patient room with anti-emetic, patient is eating [employer] sandwich.  B: S/P gallbladder with Dr. Smith  A: IV compazine given for nausea.  Patient has had numerous meds for nausea thus far.  R: Will continue to monitor.

## 2019-06-05 NOTE — OP NOTE
Procedure Date: 06/04/2019      PREOPERATIVE DIAGNOSIS:  Acute cholecystitis.      POSTOPERATIVE DIAGNOSIS:  Acute cholecystitis.      PROCEDURES PERFORMED:  Laparoscopic cholecystectomy.      SURGEON:  Jaiden Smith MD, [facility]        ANESTHESIA:  General by endotracheal tube.        INDICATIONS FOR PROCEDURE:  This is a 39-year-old lady who has presented with about a year history of right upper quadrant pain.  It became severe the past week with the pain almost doubling her over.  She then came to the ER.  Subsequent ultrasound was consistent with a probable early cholecystitis.  Because of this, it was elected to take her to the operating room for laparoscopic cholecystectomy.        OPERATIVE FINDINGS:  Included a distended, thickened gallbladder with hydrops.      DETAILS OF PROCEDURE:  The patient was taken to the operating room and placed on the table in supine position and after induction of general anesthesia, the abdomen was prepped and draped in a sterile fashion.  A timeout was performed confirming the identity of the patient as well as the procedure to be performed.  A supraumbilical incision was made.  Subcutaneous tissue was bluntly dissected.  The fascia was grasped and opened using an 11 blade and the Visiport was then inserted into the abdomen.  The abdomen insufflated to 15 mmHg pressure.  Generalized inspection of the abdomen was then carried out.  There was no evidence of injury from insertion of the port.  A large distended gallbladder was readily seen in the right upper quadrant.  Two right upper quadrant 5 mm trocars and an 11 mm subxiphoid trocar were placed.  We attempted to grasp the gallbladder.  However, it was noted to be quite distended and thickened.  A needle decompression of the gallbladder was then carried out.  The gallbladder was draining approximately 15-20 mL of a murky hydrops fluid.  The gallbladder was grasped, pulled into position.  The peritoneum over the medial and  lateral aspects of the gallbladder was taken down using a ConMed dissector to assist during this dissection.  The cystic artery was cauterized.  A large window was created in the base of the gallbladder, clearly identifying the cystic duct.  The cystic duct was then clipped and transected and the gallbladder was removed from the liver bed using cautery.  The gallbladder itself was then with the abdomen using an Endo Catch bag.  The area was then copiously irrigated.  All fluid was suctioned out.  The liver bed was inspected without evidence of any bleeding.  The subxiphoid trocar was removed.  The fascia was closed using a cone and a PMI needle with #1 PDS.  The two 5 mm trocars were removed without evidence of any bleeding.  The supraumbilical fascia trocars were removed.  The fascia was then closed using a single stitch of #1 PDS.  All skin incisions were closed using a 3-0 Vicryl and Dermabond glue.        Sterile dressings were applied.  The patient was taken from the operating room to the recovery room in stable condition to be sent home.         JOHN NY MD, FACS             D: 2019   T: 2019   MT: GISSELLE      Name:     BLANCA JACKSON   MRN:      [id number]        Account:        [id number]   :      1980           Procedure Date: 2019      Document: [id number]

## 2019-06-06 LAB — COPATH REPORT: NORMAL

## 2019-06-06 NOTE — OR NURSING
[facility] Same Day Surgery  Discharge Call Back  Stephania Barahona  1980  MRN: [id number]  Home: [phone] (home)   PCP: Britney Rodriguez    We are calling to see how you're doing since your surgery/procedure with us?   Comments: aching  Clinical Questions  1. Have you had time to look at your discharge instructions? Do you have any questions in regards to the instructions?   Comment: yes, no  2. Do you feel your pain is being controlled with the regimen the surgeon sent you home on? (ie: prescription medications, over the counter pain medications, ice packs)   Comments: yes  3. Have you noticed any drainage on your dressing? Do you know what to do if you have bleeding as a result of your procedure?   Comments: no, yes  4. Have you had any nausea/vomiting? Do you know how to treat this?   Comment: no, yes  5. Have you had any signs/symptoms of infection? (ie: fever, swelling, heat, drainage or redness) Do you know what to do if you have?   Comment: no, yes  6. Do you have a follow up appointment made with your surgeon? Do you have a number to contact them at if you need it?   Comment: no, yes  Retained Foreign Object (TYSON, Hemovac, Penrose, Wound Packing, Vaginal Packing, Nasal Splints, Urethral Stents, Wallace Catheter)  1. Do you still have no in place?   2. If the item is still in place, can you review the plan for removal with me? no      You may be randomly selected to fill out a [facility] survey. We would appreciate you taking the time to fill this out. It is important to us if you would answer all of the questions on the survey.

## 2019-06-07 ENCOUNTER — TELEPHONE (OUTPATIENT)
Dept: SURGERY | Facility: CLINIC | Age: 39
End: 2019-06-07

## 2019-06-07 NOTE — TELEPHONE ENCOUNTER
Please contact patient and assist with scheduling a post-op appointment with Dr. Smith.  Thank you . ..........Routed to specialty schedulers...............Sheyla Baker CMA  ([facility])     DOS 6-4-2019-CHOLECYSTECTOMY, LAPAROSCOPIC

## 2019-06-08 ENCOUNTER — APPOINTMENT (OUTPATIENT)
Dept: CT IMAGING | Facility: CLINIC | Age: 39
End: 2019-06-08
Attending: NURSE PRACTITIONER
Payer: MEDICAID

## 2019-06-08 ENCOUNTER — NURSE TRIAGE (OUTPATIENT)
Dept: NURSING | Facility: CLINIC | Age: 39
End: 2019-06-08

## 2019-06-08 ENCOUNTER — HOSPITAL ENCOUNTER (EMERGENCY)
Facility: CLINIC | Age: 39
Discharge: ANOTHER HOSPITAL | End: 2019-06-08
Attending: NURSE PRACTITIONER | Admitting: NURSE PRACTITIONER
Payer: MEDICAID

## 2019-06-08 ENCOUNTER — HOSPITAL ENCOUNTER (INPATIENT)
Facility: CLINIC | Age: 39
LOS: 2 days | Discharge: HOME | End: 2019-06-10
Attending: INTERNAL MEDICINE | Admitting: INTERNAL MEDICINE
Payer: MEDICAID

## 2019-06-08 VITALS
DIASTOLIC BLOOD PRESSURE: 70 MMHG | WEIGHT: 171 LBS | OXYGEN SATURATION: 98 % | TEMPERATURE: 96.5 F | BODY MASS INDEX: 28.9 KG/M2 | SYSTOLIC BLOOD PRESSURE: 110 MMHG | RESPIRATION RATE: 18 BRPM

## 2019-06-08 DIAGNOSIS — Z90.49 S/P CHOLECYSTECTOMY: ICD-10-CM

## 2019-06-08 DIAGNOSIS — R17 TOTAL BILIRUBIN, ELEVATED: ICD-10-CM

## 2019-06-08 DIAGNOSIS — K83.8 DILATED BILE DUCT: ICD-10-CM

## 2019-06-08 PROBLEM — K80.50 CHOLEDOCHOLITHIASIS: Status: ACTIVE | Noted: 2019-06-08

## 2019-06-08 LAB
ALBUMIN SERPL-MCNC: 3.3 G/DL (ref 3.4–5)
ALBUMIN UR-MCNC: NEGATIVE MG/DL
ALP SERPL-CCNC: 319 U/L (ref 40–150)
ALT SERPL W P-5'-P-CCNC: 329 U/L (ref 0–50)
ANION GAP SERPL CALCULATED.3IONS-SCNC: 8 MMOL/L (ref 3–14)
APPEARANCE UR: ABNORMAL
AST SERPL W P-5'-P-CCNC: 88 U/L (ref 0–45)
BACTERIA #/AREA URNS HPF: ABNORMAL /HPF
BASOPHILS # BLD AUTO: 0 10E9/L (ref 0–0.2)
BASOPHILS NFR BLD AUTO: 0.6 %
BILIRUB SERPL-MCNC: 5.6 MG/DL (ref 0.2–1.3)
BILIRUB UR QL STRIP: ABNORMAL
BUN SERPL-MCNC: 10 MG/DL (ref 7–30)
CALCIUM SERPL-MCNC: 8.9 MG/DL (ref 8.5–10.1)
CHLORIDE SERPL-SCNC: 105 MMOL/L (ref 94–109)
CO2 SERPL-SCNC: 27 MMOL/L (ref 20–32)
COLOR UR AUTO: ABNORMAL
CREAT SERPL-MCNC: 0.69 MG/DL (ref 0.52–1.04)
DIFFERENTIAL METHOD BLD: NORMAL
EOSINOPHIL NFR BLD AUTO: 1.1 %
ERYTHROCYTE [DISTWIDTH] IN BLOOD BY AUTOMATED COUNT: 12.7 % (ref 10–15)
GFR SERPL CREATININE-BSD FRML MDRD: >90 ML/MIN/{1.73_M2}
GLUCOSE SERPL-MCNC: 103 MG/DL (ref 70–99)
GLUCOSE UR STRIP-MCNC: NEGATIVE MG/DL
HCT VFR BLD AUTO: 42.6 % (ref 35–47)
HGB BLD-MCNC: 14.6 G/DL (ref 11.7–15.7)
HGB UR QL STRIP: ABNORMAL
IMM GRANULOCYTES # BLD: 0 10E9/L (ref 0–0.4)
IMM GRANULOCYTES NFR BLD: 0.6 %
KETONES UR STRIP-MCNC: NEGATIVE MG/DL
LEUKOCYTE ESTERASE UR QL STRIP: NEGATIVE
LIPASE SERPL-CCNC: 156 U/L (ref 73–393)
LYMPHOCYTES # BLD AUTO: 1 10E9/L (ref 0.8–5.3)
LYMPHOCYTES NFR BLD AUTO: 18.6 %
MCH RBC QN AUTO: 30.5 PG (ref 26.5–33)
MCHC RBC AUTO-ENTMCNC: 34.3 G/DL (ref 31.5–36.5)
MCV RBC AUTO: 89 FL (ref 78–100)
MONOCYTES # BLD AUTO: 0.6 10E9/L (ref 0–1.3)
MONOCYTES NFR BLD AUTO: 11.6 %
NEUTROPHILS # BLD AUTO: 3.7 10E9/L (ref 1.6–8.3)
NEUTROPHILS NFR BLD AUTO: 67.5 %
NITRATE UR QL: NEGATIVE
NRBC # BLD AUTO: 0 10*3/UL
NRBC BLD AUTO-RTO: 0 /100
PH UR STRIP: 5 PH (ref 5–7)
PLATELET # BLD AUTO: 254 10E9/L (ref 150–450)
POTASSIUM SERPL-SCNC: 3.8 MMOL/L (ref 3.4–5.3)
PROT SERPL-MCNC: 6.6 G/DL (ref 6.8–8.8)
RBC # BLD AUTO: 4.79 10E12/L (ref 3.8–5.2)
RBC #/AREA URNS AUTO: 7 /HPF (ref 0–2)
SODIUM SERPL-SCNC: 140 MMOL/L (ref 133–144)
SOURCE: ABNORMAL
SP GR UR STRIP: 1.02 (ref 1–1.03)
SQUAMOUS #/AREA URNS AUTO: 3 /HPF (ref 0–1)
UROBILINOGEN UR STRIP-MCNC: 4 MG/DL (ref 0–2)
WBC # BLD AUTO: 5.4 10E9/L (ref 4–11)
WBC #/AREA URNS AUTO: 8 /HPF (ref 0–5)

## 2019-06-08 PROCEDURE — 25000128 H RX IP 250 OP 636: Performed by: NURSE PRACTITIONER

## 2019-06-08 PROCEDURE — 74177 CT ABD & PELVIS W/CONTRAST: CPT

## 2019-06-08 PROCEDURE — 99285 EMERGENCY DEPT VISIT HI MDM: CPT | Mod: 25 | Performed by: FAMILY MEDICINE

## 2019-06-08 PROCEDURE — 80053 COMPREHEN METABOLIC PANEL: CPT | Performed by: NURSE PRACTITIONER

## 2019-06-08 PROCEDURE — 96361 HYDRATE IV INFUSION ADD-ON: CPT | Performed by: FAMILY MEDICINE

## 2019-06-08 PROCEDURE — 99222 1ST HOSP IP/OBS MODERATE 55: CPT | Mod: AI | Performed by: INTERNAL MEDICINE

## 2019-06-08 PROCEDURE — 85025 COMPLETE CBC W/AUTO DIFF WBC: CPT | Performed by: NURSE PRACTITIONER

## 2019-06-08 PROCEDURE — 81001 URINALYSIS AUTO W/SCOPE: CPT | Performed by: NURSE PRACTITIONER

## 2019-06-08 PROCEDURE — 99207 ZZC APP CREDIT; MD BILLING SHARED VISIT: CPT | Performed by: NURSE PRACTITIONER

## 2019-06-08 PROCEDURE — 25000132 ZZH RX MED GY IP 250 OP 250 PS 637: Performed by: NURSE PRACTITIONER

## 2019-06-08 PROCEDURE — 12000001 ZZH R&B MED SURG/OB UMMC

## 2019-06-08 PROCEDURE — 96374 THER/PROPH/DIAG INJ IV PUSH: CPT | Performed by: FAMILY MEDICINE

## 2019-06-08 PROCEDURE — 25000125 ZZHC RX 250: Performed by: NURSE PRACTITIONER

## 2019-06-08 PROCEDURE — 99285 EMERGENCY DEPT VISIT HI MDM: CPT | Mod: Z6 | Performed by: FAMILY MEDICINE

## 2019-06-08 PROCEDURE — 83690 ASSAY OF LIPASE: CPT | Performed by: NURSE PRACTITIONER

## 2019-06-08 RX ORDER — ONDANSETRON 2 MG/ML
4 INJECTION INTRAMUSCULAR; INTRAVENOUS EVERY 30 MIN PRN
Status: DISCONTINUED | OUTPATIENT
Start: 2019-06-08 | End: 2019-06-08 | Stop reason: HOSPADM

## 2019-06-08 RX ORDER — ACETAMINOPHEN 650 MG/1
650 SUPPOSITORY RECTAL EVERY 4 HOURS PRN
Status: DISCONTINUED | OUTPATIENT
Start: 2019-06-08 | End: 2019-06-10 | Stop reason: HOSPADM

## 2019-06-08 RX ORDER — SODIUM CHLORIDE 9 MG/ML
1000 INJECTION, SOLUTION INTRAVENOUS CONTINUOUS
Status: DISCONTINUED | OUTPATIENT
Start: 2019-06-08 | End: 2019-06-08 | Stop reason: HOSPADM

## 2019-06-08 RX ORDER — BISACODYL 10 MG
10 SUPPOSITORY, RECTAL RECTAL DAILY PRN
Status: DISCONTINUED | OUTPATIENT
Start: 2019-06-08 | End: 2019-06-10 | Stop reason: HOSPADM

## 2019-06-08 RX ORDER — HYDROMORPHONE HCL/0.9% NACL/PF 0.2MG/0.2
.2-.4 SYRINGE (ML) INTRAVENOUS
Status: DISCONTINUED | OUTPATIENT
Start: 2019-06-08 | End: 2019-06-10 | Stop reason: HOSPADM

## 2019-06-08 RX ORDER — NALOXONE HYDROCHLORIDE 0.4 MG/ML
.1-.4 INJECTION, SOLUTION INTRAMUSCULAR; INTRAVENOUS; SUBCUTANEOUS
Status: DISCONTINUED | OUTPATIENT
Start: 2019-06-08 | End: 2019-06-09

## 2019-06-08 RX ORDER — ONDANSETRON 4 MG/1
4 TABLET, ORALLY DISINTEGRATING ORAL EVERY 6 HOURS PRN
Status: DISCONTINUED | OUTPATIENT
Start: 2019-06-08 | End: 2019-06-10 | Stop reason: HOSPADM

## 2019-06-08 RX ORDER — CYCLOBENZAPRINE HCL 5 MG
5-10 TABLET ORAL AT BEDTIME
Status: DISCONTINUED | OUTPATIENT
Start: 2019-06-08 | End: 2019-06-10 | Stop reason: HOSPADM

## 2019-06-08 RX ORDER — METOCLOPRAMIDE HYDROCHLORIDE 5 MG/ML
10 INJECTION INTRAMUSCULAR; INTRAVENOUS EVERY 6 HOURS PRN
Status: DISCONTINUED | OUTPATIENT
Start: 2019-06-08 | End: 2019-06-10 | Stop reason: HOSPADM

## 2019-06-08 RX ORDER — ACETAMINOPHEN 325 MG/1
650 TABLET ORAL EVERY 4 HOURS PRN
Status: DISCONTINUED | OUTPATIENT
Start: 2019-06-08 | End: 2019-06-10 | Stop reason: HOSPADM

## 2019-06-08 RX ORDER — HYDROMORPHONE HYDROCHLORIDE 1 MG/ML
0.5 INJECTION, SOLUTION INTRAMUSCULAR; INTRAVENOUS; SUBCUTANEOUS
Status: DISCONTINUED | OUTPATIENT
Start: 2019-06-08 | End: 2019-06-08 | Stop reason: HOSPADM

## 2019-06-08 RX ORDER — LIDOCAINE 40 MG/G
CREAM TOPICAL
Status: DISCONTINUED | OUTPATIENT
Start: 2019-06-08 | End: 2019-06-10 | Stop reason: HOSPADM

## 2019-06-08 RX ORDER — ONDANSETRON 2 MG/ML
4 INJECTION INTRAMUSCULAR; INTRAVENOUS EVERY 6 HOURS PRN
Status: DISCONTINUED | OUTPATIENT
Start: 2019-06-08 | End: 2019-06-10 | Stop reason: HOSPADM

## 2019-06-08 RX ORDER — AMOXICILLIN 250 MG
1 CAPSULE ORAL 2 TIMES DAILY PRN
Status: DISCONTINUED | OUTPATIENT
Start: 2019-06-08 | End: 2019-06-09

## 2019-06-08 RX ORDER — IBUPROFEN 200 MG
800 TABLET ORAL EVERY 4 HOURS PRN
Status: ON HOLD | COMMUNITY
End: 2019-06-10

## 2019-06-08 RX ORDER — PROCHLORPERAZINE MALEATE 5 MG
10 TABLET ORAL EVERY 6 HOURS PRN
Status: DISCONTINUED | OUTPATIENT
Start: 2019-06-08 | End: 2019-06-10 | Stop reason: HOSPADM

## 2019-06-08 RX ORDER — PROCHLORPERAZINE 25 MG
25 SUPPOSITORY, RECTAL RECTAL EVERY 12 HOURS PRN
Status: DISCONTINUED | OUTPATIENT
Start: 2019-06-08 | End: 2019-06-10 | Stop reason: HOSPADM

## 2019-06-08 RX ORDER — METOCLOPRAMIDE 10 MG/1
10 TABLET ORAL EVERY 6 HOURS PRN
Status: DISCONTINUED | OUTPATIENT
Start: 2019-06-08 | End: 2019-06-10 | Stop reason: HOSPADM

## 2019-06-08 RX ORDER — AMOXICILLIN 250 MG
2 CAPSULE ORAL 2 TIMES DAILY PRN
Status: DISCONTINUED | OUTPATIENT
Start: 2019-06-08 | End: 2019-06-09

## 2019-06-08 RX ORDER — IOPAMIDOL 755 MG/ML
500 INJECTION, SOLUTION INTRAVASCULAR ONCE
Status: COMPLETED | OUTPATIENT
Start: 2019-06-08 | End: 2019-06-08

## 2019-06-08 RX ADMIN — SODIUM CHLORIDE 1000 ML: 9 INJECTION, SOLUTION INTRAVENOUS at 14:30

## 2019-06-08 RX ADMIN — CYCLOBENZAPRINE HYDROCHLORIDE 5 MG: 5 TABLET, FILM COATED ORAL at 22:14

## 2019-06-08 RX ADMIN — ACETAMINOPHEN 650 MG: 325 TABLET, FILM COATED ORAL at 22:18

## 2019-06-08 RX ADMIN — IOPAMIDOL 85 ML: 755 INJECTION, SOLUTION INTRAVENOUS at 16:17

## 2019-06-08 RX ADMIN — SODIUM CHLORIDE 60 ML: 9 INJECTION, SOLUTION INTRAVENOUS at 16:17

## 2019-06-08 RX ADMIN — ONDANSETRON 4 MG: 2 INJECTION INTRAMUSCULAR; INTRAVENOUS at 14:51

## 2019-06-08 ASSESSMENT — ENCOUNTER SYMPTOMS
FATIGUE: 1
NAUSEA: 1
CHILLS: 1
RESPIRATORY NEGATIVE: 1
NEUROLOGICAL NEGATIVE: 1
BACK PAIN: 1
CARDIOVASCULAR NEGATIVE: 1
APPETITE CHANGE: 1
HEMATOLOGIC/LYMPHATIC NEGATIVE: 1
ACTIVITY CHANGE: 1
ABDOMINAL PAIN: 1

## 2019-06-08 ASSESSMENT — PAIN DESCRIPTION - DESCRIPTORS: DESCRIPTORS: SORE;DISCOMFORT;CONSTANT

## 2019-06-08 NOTE — TELEPHONE ENCOUNTER
"Patient calling reporting she had gallbladder surgery on 6/4/19. Reporting abdominal bloating increased discomfort. \"I am struggling to eat or drink .\" Reporting abdominal tenderness and bloating. Last bowel movement prior to surgery. Patient rating pain \"5\" on 0-10 pain scale. Patient is requesting a refill of Zofran. Stating she took her last Zofran last evening. Afebrile. Patient questioning if abdominal discomfort are normal following procedure. Reporting increased tenderness with ambulation. Voiding this morning.     Paged on call General Surgeon for Dr Smith through Answering Service. Dr Sanket Rivers on call. Paged to call patient at Phone . Page placed at 1240 pm. Advised to call back if no return call with in 20 minutes.    Hanny Trujillo RN  [facility]       Reason for Disposition    [1] Constant abdominal pain AND [2] present > 2 hours    Additional Information    Negative: Sounds like a life-threatening emergency to the triager    Negative: Chest pain    Negative: Difficulty breathing    Negative: Surgical incision symptoms and questions    Negative: [1] Discomfort (pain, burning or stinging) when passing urine AND [2] male    Negative: [1] Discomfort (pain, burning or stinging) when passing urine AND [2] female    Constipation    Negative: [1] Abdomen pain is main symptom AND [2] adult male    Negative: [1] Abdomen pain is main symptom AND [2] adult female    Negative: Rectal bleeding or blood in stool is main symptom    Negative: Rectal pain or itching is main symptom    Negative: Constipation in a cancer patient who is currently (or recently) receiving chemotherapy or radiation therapy, or cancer patient who has metastatic or end-stage cancer and is receiving palliative care    Negative: Patient sounds very sick or weak to the triager    Negative: [1] Vomiting AND [2] abdomen looks much more swollen than usual    Negative: [1] Vomiting AND [2] contains bile (green " color)    Protocols used: CONSTIPATION-A-AH, POST-OP SYMPTOMS AND NWNISPCBB-Z-SD

## 2019-06-08 NOTE — ED PROVIDER NOTES
History     Chief Complaint   Patient presents with     Nausea     Bloated     HPI  Stephania Barahona is a 39 year old female whom underwent laparoscopic cholecystectomy by Dr. Smith 6/4/2019 for cholecystitis/cholelithiasis presenting with worsening RUQ abdominal pain, persistent nausea, and bloated feeling over past 2-3 days since discharge. She reports she is fatigued and having hot flashes. She has had very minimal flatus and no defecation. She reports she took Motrin this morning for pain as she is currently out of her Percocet. She reports pain is 6/10 and radiates to right flank.     Denies vomiting, excessive belching, fever, cough, dyspnea, dysuria, hematuria.    PCP: Britney Rodriguez     Allergies:  Allergies   Allergen Reactions     Clindamycin/Lincomycin Nausea     Nubain [Nalbuphine Hcl] Nausea and Vomiting     And dizziness       Problem List:    Patient Active Problem List    Diagnosis Date Noted     Hypertriglyceridemia 01/27/2015     Priority: Medium     CARDIOVASCULAR SCREENING; LDL GOAL LESS THAN 160 10/31/2010     Priority: Medium     Female infertility 09/30/2009     Priority: Medium     Problem list name updated by automated process. Provider to review       Closed fracture of lumbar vertebra (H) 12/24/2007     Priority: Medium     Problem list name updated by automated process. Provider to review       Degeneration of lumbar or lumbosacral intervertebral disc 12/24/2007     Priority: Medium        Past Medical History:    Past Medical History:   Diagnosis Date     Motion sickness      NO ACTIVE PROBLEMS        Past Surgical History:    Past Surgical History:   Procedure Laterality Date     C NONSPECIFIC PROCEDURE  2007    lumbar fracture     D & C  03/25/11     LAPAROSCOPIC CHOLECYSTECTOMY N/A 6/4/2019    Procedure: CHOLECYSTECTOMY, LAPAROSCOPIC;  Surgeon: Jaiden Smith MD;  Location: [facility]     TONSILLECTOMY  1991       Family History:    Family History   Problem Relation Age of Onset      Asthma Sister      Diabetes Sister         gestational diabetes     Asthma Sister      Asthma Brother      Psychotic Disorder Maternal Grandfather      Depression Maternal Grandfather      Alcohol/Drug Maternal Grandfather      Alzheimer Disease Paternal Grandfather         Dementia       Social History:  Marital Status:   [2]  Social History     Tobacco Use     Smoking status: Current Every Day Smoker     Smokeless tobacco: Never Used     Tobacco comment: 2004   Substance Use Topics     Alcohol use: No     Comment: rarely     Drug use: No        Medications:      cyclobenzaprine (FLEXERIL) 10 MG tablet   ibuprofen (ADVIL/MOTRIN) 200 MG tablet   ondansetron (ZOFRAN-ODT) 4 MG ODT tab   oxyCODONE-acetaminophen (PERCOCET) 5-325 MG tablet         Review of Systems   Constitutional: Positive for activity change, appetite change, chills and fatigue.   HENT: Negative.    Respiratory: Negative.    Cardiovascular: Negative.    Gastrointestinal: Positive for abdominal pain and nausea.   Genitourinary: Negative.    Musculoskeletal: Positive for back pain.   Skin: Negative.    Allergic/Immunologic: Negative for immunocompromised state.   Neurological: Negative.    Hematological: Negative.    All other systems reviewed and are negative.      Physical Exam   BP: 111/75  Heart Rate: 70  Temp: 96.5  F (35.8  C)  Resp: 16  Weight: 77.6 kg (171 lb)  SpO2: 97 %      Physical Exam   Constitutional: She appears well-developed and well-nourished. She is active and cooperative. She appears ill. No distress.   Appears fatigued and not feeling well but not sickly nor toxic   HENT:   Head: Normocephalic and atraumatic.   Mouth/Throat: Oropharynx is clear and moist.   Eyes: Conjunctivae are normal.   Cardiovascular: Normal rate, regular rhythm and normal heart sounds.   Pulmonary/Chest: Effort normal and breath sounds normal.   Abdominal: Soft. She exhibits no distension. Bowel sounds are decreased. There is no hepatosplenomegaly.  There is tenderness in the right upper quadrant and epigastric area. There is no CVA tenderness.       Musculoskeletal: She exhibits no edema.   Neurological: She is alert.   Skin: Skin is warm and dry. She is not diaphoretic.   Psychiatric: She has a normal mood and affect. Her behavior is normal.   Nursing note and vitals reviewed.      ED Course  Discussed with patient I would like to proceed with CT of abdomen eval for abscess, eval pancrease and CBD, eval for ileus vs SBO. May need eval with US and possibly ERCP.    1528: Discussed with patient elevated bilirubin and LFTs concerning for CBD stone blockage. She is unable to drink second glass of water- reports worsening nausea and pain.  Instructed she may stop the water bolus.  Called CT tech and notified may get her for CT.  Pending on result consider US.     1658: Discussed labs and CT scan which shows biliary dilation suspect bile duct stone with Dr. Rivers of general surgery and he recommends calling [facility] for ERCP. Discussed with patient and she would like to drive herself to [facility]. She is hemodynamically stable and I feel that is appropriate.     1710: Discussed with hospitalist and GI: Dr. Soriano and Dr. Smith at [facility] and they will admit patient overnight for ERCP in the morning.             Procedures               Critical Care time:  none               Results for orders placed or performed during the hospital encounter of 06/08/19 (from the past 24 hour(s))   CBC with platelets differential   Result Value Ref Range    WBC 5.4 4.0 - 11.0 10e9/L    RBC Count 4.79 3.8 - 5.2 10e12/L    Hemoglobin 14.6 11.7 - 15.7 g/dL    Hematocrit 42.6 35.0 - 47.0 %    MCV 89 78 - 100 fl    MCH 30.5 26.5 - 33.0 pg    MCHC 34.3 31.5 - 36.5 g/dL    RDW 12.7 10.0 - 15.0 %    Platelet Count 254 150 - 450 10e9/L    Diff Method Automated Method     % Neutrophils 67.5 %    % Lymphocytes 18.6 %    % Monocytes 11.6 %    % Eosinophils 1.1 %    % Basophils 0.6 %    % Immature  Granulocytes 0.6 %    Nucleated RBCs 0 0 /100    Absolute Neutrophil 3.7 1.6 - 8.3 10e9/L    Absolute Lymphocytes 1.0 0.8 - 5.3 10e9/L    Absolute Monocytes 0.6 0.0 - 1.3 10e9/L    Absolute Basophils 0.0 0.0 - 0.2 10e9/L    Abs Immature Granulocytes 0.0 0 - 0.4 10e9/L    Absolute Nucleated RBC 0.0    Comprehensive metabolic panel   Result Value Ref Range    Sodium 140 133 - 144 mmol/L    Potassium 3.8 3.4 - 5.3 mmol/L    Chloride 105 94 - 109 mmol/L    Carbon Dioxide 27 20 - 32 mmol/L    Anion Gap 8 3 - 14 mmol/L    Glucose 103 (H) 70 - 99 mg/dL    Urea Nitrogen 10 7 - 30 mg/dL    Creatinine 0.69 0.52 - 1.04 mg/dL    GFR Estimate >90 >60 mL/min/[1.73_m2]    GFR Estimate If Black >90 >60 mL/min/[1.73_m2]    Calcium 8.9 8.5 - 10.1 mg/dL    Bilirubin Total 5.6 (H) 0.2 - 1.3 mg/dL    Albumin 3.3 (L) 3.4 - 5.0 g/dL    Protein Total 6.6 (L) 6.8 - 8.8 g/dL    Alkaline Phosphatase 319 (H) 40 - 150 U/L     (H) 0 - 50 U/L    AST 88 (H) 0 - 45 U/L   Lipase   Result Value Ref Range    Lipase 156 73 - 393 U/L   UA reflex to Microscopic and Culture   Result Value Ref Range    Color Urine Karishma     Appearance Urine Slightly Cloudy     Glucose Urine Negative NEG^Negative mg/dL    Bilirubin Urine Moderate (A) NEG^Negative    Ketones Urine Negative NEG^Negative mg/dL    Specific Gravity Urine 1.024 1.003 - 1.035    Blood Urine Small (A) NEG^Negative    pH Urine 5.0 5.0 - 7.0 pH    Protein Albumin Urine Negative NEG^Negative mg/dL    Urobilinogen mg/dL 4.0 (H) 0.0 - 2.0 mg/dL    Nitrite Urine Negative NEG^Negative    Leukocyte Esterase Urine Negative NEG^Negative    Source Unspecified Urine     RBC Urine 7 (H) 0 - 2 /HPF    WBC Urine 8 (H) 0 - 5 /HPF    Bacteria Urine Few (A) NEG^Negative /HPF    Squamous Epithelial /HPF Urine 3 (H) 0 - 1 /HPF   CT Abdomen Pelvis w Contrast    Narrative    CT ABDOMEN PELVIS W CONTRAST 6/8/2019 4:25 PM    HISTORY: Abdominal pain.    TECHNIQUE: CT of the abdomen and pelvis is performed with  85mL,  Isovue-370 intravenously. Radiation dose for this scan was reduced  using automated exposure control, adjustment of the mA and/or kV  according to patient size, or iterative reconstruction technique.    COMPARISON: November 9, 2017.    FINDINGS: The visualized lung bases demonstrate a minimal right  effusion and atelectasis.     Liver: Normal.  Gallbladder: Status post cholecystectomy with intra and extrahepatic  biliary ductal dilatation. There is apparent narrowing of the  extrahepatic biliary duct near the duodenal ampulla. No fluid  collections appreciated.  Pancreas:Normal.  Spleen:Normal.  Adrenals:Normal.  Ascites:None.    Kidneys:Normal.  Bladder:Normal.  Pelvic free fluid: Moderate.    Bowels: Colonic distention with stool.  No evidence of obstruction.  Appendix: Not well visualized but there are no findings suggestive of  appendicitis.    Abdominal or pelvic lymphadenopathy:None.    Miscellaneous findings:None.      Impression    IMPRESSION:    1. Minimal right pleural effusion and dependent atelectasis.  2. Postoperative changes of cholecystectomy with mild to moderate  intrahepatic and extrahepatic biliary ductal dilatation, with  narrowing of the extrahepatic biliary duct near the duodenal ampulla.  3. No evidence of bowel obstruction. Moderate colonic distention with  stool.  4. Moderate free fluid in the pelvis.    ROSE MARIE DE OLIVEIRA MD       Medications   0.9% sodium chloride BOLUS (0 mLs Intravenous Stopped 6/8/19 1738)     Followed by   sodium chloride 0.9% infusion (has no administration in time range)   ondansetron (ZOFRAN) injection 4 mg (4 mg Intravenous Given 6/8/19 1451)   HYDROmorphone (PF) (DILAUDID) injection 0.5 mg (0.5 mg Intravenous Not Given 6/8/19 1812)   Saline 100mL Bag (60 mLs Intravenous Given 6/8/19 1617)   iopamidol (ISOVUE-370) solution 500 mL (85 mLs Intravenous Given 6/8/19 1617)   sodium chloride (PF) 0.9% PF flush 3 mL (3 mLs Intravenous Given 6/8/19 1616)        Assessments & Plan (with Medical Decision Making)  1: Dilation of CBD  2: Elevated bilirubin and LFTs    Patient in need of ERCP.  She has been accepted in transfer to [facility] by private car for direct admit and GI will perform ERCP in morning.      I have reviewed the nursing notes.    I have reviewed the findings, diagnosis, plan and need for follow up with the patient.          Medication List      ASK your doctor about these medications    HYDROcodone-acetaminophen 5-325 MG tablet  Commonly known as:  NORCO  1-2 tablets, Oral, EVERY 6 HOURS PRN  Ask about: Should I take this medication?            Final diagnoses:   Dilated bile duct   S/P cholecystectomy   Total bilirubin, elevated       6/8/2019   [facility] EMERGENCY DEPARTMENT     Stephanie Ambrose, LINDA CNP  06/08/19 2020

## 2019-06-09 ENCOUNTER — ANESTHESIA (OUTPATIENT)
Dept: SURGERY | Facility: CLINIC | Age: 39
End: 2019-06-09
Payer: MEDICAID

## 2019-06-09 ENCOUNTER — ANESTHESIA EVENT (OUTPATIENT)
Dept: SURGERY | Facility: CLINIC | Age: 39
End: 2019-06-09
Payer: MEDICAID

## 2019-06-09 ENCOUNTER — APPOINTMENT (OUTPATIENT)
Dept: GENERAL RADIOLOGY | Facility: CLINIC | Age: 39
End: 2019-06-09
Attending: INTERNAL MEDICINE
Payer: MEDICAID

## 2019-06-09 LAB
ALBUMIN SERPL-MCNC: 3.3 G/DL (ref 3.4–5)
ALP SERPL-CCNC: 328 U/L (ref 40–150)
ALT SERPL W P-5'-P-CCNC: 253 U/L (ref 0–50)
ANION GAP SERPL CALCULATED.3IONS-SCNC: 6 MMOL/L (ref 3–14)
AST SERPL W P-5'-P-CCNC: 75 U/L (ref 0–45)
BILIRUB SERPL-MCNC: 6.2 MG/DL (ref 0.2–1.3)
BUN SERPL-MCNC: 7 MG/DL (ref 7–30)
CALCIUM SERPL-MCNC: 9 MG/DL (ref 8.5–10.1)
CHLORIDE SERPL-SCNC: 104 MMOL/L (ref 94–109)
CO2 SERPL-SCNC: 26 MMOL/L (ref 20–32)
CREAT SERPL-MCNC: 0.68 MG/DL (ref 0.52–1.04)
ERCP: NORMAL
GFR SERPL CREATININE-BSD FRML MDRD: >90 ML/MIN/{1.73_M2}
GLUCOSE SERPL-MCNC: 92 MG/DL (ref 70–99)
INR PPP: 0.96 (ref 0.86–1.14)
POTASSIUM SERPL-SCNC: 3.9 MMOL/L (ref 3.4–5.3)
PROT SERPL-MCNC: 6.6 G/DL (ref 6.8–8.8)
SODIUM SERPL-SCNC: 136 MMOL/L (ref 133–144)

## 2019-06-09 PROCEDURE — 99207 ZZC APP CREDIT; MD BILLING SHARED VISIT: CPT | Performed by: PHYSICIAN ASSISTANT

## 2019-06-09 PROCEDURE — 25000128 H RX IP 250 OP 636: Performed by: STUDENT IN AN ORGANIZED HEALTH CARE EDUCATION/TRAINING PROGRAM

## 2019-06-09 PROCEDURE — 99233 SBSQ HOSP IP/OBS HIGH 50: CPT | Performed by: INTERNAL MEDICINE

## 2019-06-09 PROCEDURE — 36000059 ZZH SURGERY LEVEL 3 EA 15 ADDTL MIN UMMC: Performed by: INTERNAL MEDICINE

## 2019-06-09 PROCEDURE — 25000132 ZZH RX MED GY IP 250 OP 250 PS 637: Performed by: NURSE PRACTITIONER

## 2019-06-09 PROCEDURE — 71000014 ZZH RECOVERY PHASE 1 LEVEL 2 FIRST HR: Performed by: INTERNAL MEDICINE

## 2019-06-09 PROCEDURE — 25000125 ZZHC RX 250: Performed by: INTERNAL MEDICINE

## 2019-06-09 PROCEDURE — 25000125 ZZHC RX 250: Performed by: STUDENT IN AN ORGANIZED HEALTH CARE EDUCATION/TRAINING PROGRAM

## 2019-06-09 PROCEDURE — 0F798DZ DILATION OF COMMON BILE DUCT WITH INTRALUMINAL DEVICE, VIA NATURAL OR ARTIFICIAL OPENING ENDOSCOPIC: ICD-10-PCS | Performed by: INTERNAL MEDICINE

## 2019-06-09 PROCEDURE — 37000009 ZZH ANESTHESIA TECHNICAL FEE, EACH ADDTL 15 MIN: Performed by: INTERNAL MEDICINE

## 2019-06-09 PROCEDURE — 25000132 ZZH RX MED GY IP 250 OP 250 PS 637: Performed by: INTERNAL MEDICINE

## 2019-06-09 PROCEDURE — 40000170 ZZH STATISTIC PRE-PROCEDURE ASSESSMENT II: Performed by: INTERNAL MEDICINE

## 2019-06-09 PROCEDURE — 40000277 XR SURGERY CARM FLUORO LESS THAN 5 MIN W STILLS: Mod: TC

## 2019-06-09 PROCEDURE — 12000001 ZZH R&B MED SURG/OB UMMC

## 2019-06-09 PROCEDURE — 25800030 ZZH RX IP 258 OP 636: Performed by: STUDENT IN AN ORGANIZED HEALTH CARE EDUCATION/TRAINING PROGRAM

## 2019-06-09 PROCEDURE — 25000132 ZZH RX MED GY IP 250 OP 250 PS 637: Performed by: PHYSICIAN ASSISTANT

## 2019-06-09 PROCEDURE — C1877 STENT, NON-COAT/COV W/O DEL: HCPCS | Performed by: INTERNAL MEDICINE

## 2019-06-09 PROCEDURE — 36415 COLL VENOUS BLD VENIPUNCTURE: CPT | Performed by: PHYSICIAN ASSISTANT

## 2019-06-09 PROCEDURE — 25000128 H RX IP 250 OP 636: Performed by: NURSE PRACTITIONER

## 2019-06-09 PROCEDURE — 25500064 ZZH RX 255 OP 636: Performed by: INTERNAL MEDICINE

## 2019-06-09 PROCEDURE — 36000061 ZZH SURGERY LEVEL 3 W FLUORO 1ST 30 MIN - UMMC: Performed by: INTERNAL MEDICINE

## 2019-06-09 PROCEDURE — 85610 PROTHROMBIN TIME: CPT | Performed by: PHYSICIAN ASSISTANT

## 2019-06-09 PROCEDURE — 27210794 ZZH OR GENERAL SUPPLY STERILE: Performed by: INTERNAL MEDICINE

## 2019-06-09 PROCEDURE — 80053 COMPREHEN METABOLIC PANEL: CPT | Performed by: PHYSICIAN ASSISTANT

## 2019-06-09 PROCEDURE — 25000132 ZZH RX MED GY IP 250 OP 250 PS 637: Performed by: ANESTHESIOLOGY

## 2019-06-09 PROCEDURE — 37000008 ZZH ANESTHESIA TECHNICAL FEE, 1ST 30 MIN: Performed by: INTERNAL MEDICINE

## 2019-06-09 PROCEDURE — 25000125 ZZHC RX 250: Performed by: ANESTHESIOLOGY

## 2019-06-09 PROCEDURE — 25800030 ZZH RX IP 258 OP 636: Performed by: INTERNAL MEDICINE

## 2019-06-09 PROCEDURE — 25000566 ZZH SEVOFLURANE, EA 15 MIN: Performed by: INTERNAL MEDICINE

## 2019-06-09 PROCEDURE — 25000128 H RX IP 250 OP 636: Performed by: ANESTHESIOLOGY

## 2019-06-09 DEVICE — IMPLANTABLE DEVICE: Type: IMPLANTABLE DEVICE | Site: BILE DUCT | Status: FUNCTIONAL

## 2019-06-09 RX ORDER — DEXAMETHASONE SODIUM PHOSPHATE 4 MG/ML
4 INJECTION, SOLUTION INTRA-ARTICULAR; INTRALESIONAL; INTRAMUSCULAR; INTRAVENOUS; SOFT TISSUE
Status: DISCONTINUED | OUTPATIENT
Start: 2019-06-09 | End: 2019-06-10 | Stop reason: HOSPADM

## 2019-06-09 RX ORDER — ONDANSETRON 2 MG/ML
4 INJECTION INTRAMUSCULAR; INTRAVENOUS EVERY 30 MIN PRN
Status: DISCONTINUED | OUTPATIENT
Start: 2019-06-09 | End: 2019-06-10 | Stop reason: HOSPADM

## 2019-06-09 RX ORDER — PROPOFOL 10 MG/ML
INJECTION, EMULSION INTRAVENOUS PRN
Status: DISCONTINUED | OUTPATIENT
Start: 2019-06-09 | End: 2019-06-09

## 2019-06-09 RX ORDER — INDOMETHACIN 50 MG/1
100 SUPPOSITORY RECTAL
Status: COMPLETED | OUTPATIENT
Start: 2019-06-09 | End: 2019-06-09

## 2019-06-09 RX ORDER — HYDRALAZINE HYDROCHLORIDE 20 MG/ML
2.5-5 INJECTION INTRAMUSCULAR; INTRAVENOUS EVERY 10 MIN PRN
Status: DISCONTINUED | OUTPATIENT
Start: 2019-06-09 | End: 2019-06-09 | Stop reason: HOSPADM

## 2019-06-09 RX ORDER — AMOXICILLIN 250 MG
1 CAPSULE ORAL 2 TIMES DAILY
Status: DISCONTINUED | OUTPATIENT
Start: 2019-06-09 | End: 2019-06-10 | Stop reason: HOSPADM

## 2019-06-09 RX ORDER — HYDROMORPHONE HYDROCHLORIDE 1 MG/ML
.3-.5 INJECTION, SOLUTION INTRAMUSCULAR; INTRAVENOUS; SUBCUTANEOUS EVERY 5 MIN PRN
Status: DISCONTINUED | OUTPATIENT
Start: 2019-06-09 | End: 2019-06-09 | Stop reason: CLARIF

## 2019-06-09 RX ORDER — DEXAMETHASONE SODIUM PHOSPHATE 4 MG/ML
4 INJECTION, SOLUTION INTRA-ARTICULAR; INTRALESIONAL; INTRAMUSCULAR; INTRAVENOUS; SOFT TISSUE
Status: DISCONTINUED | OUTPATIENT
Start: 2019-06-09 | End: 2019-06-09 | Stop reason: HOSPADM

## 2019-06-09 RX ORDER — ONDANSETRON 4 MG/1
4 TABLET, ORALLY DISINTEGRATING ORAL EVERY 6 HOURS PRN
Status: DISCONTINUED | OUTPATIENT
Start: 2019-06-09 | End: 2019-06-09

## 2019-06-09 RX ORDER — KETOROLAC TROMETHAMINE 30 MG/ML
30 INJECTION, SOLUTION INTRAMUSCULAR; INTRAVENOUS
Status: DISCONTINUED | OUTPATIENT
Start: 2019-06-09 | End: 2019-06-10 | Stop reason: HOSPADM

## 2019-06-09 RX ORDER — SODIUM CHLORIDE, SODIUM LACTATE, POTASSIUM CHLORIDE, CALCIUM CHLORIDE 600; 310; 30; 20 MG/100ML; MG/100ML; MG/100ML; MG/100ML
INJECTION, SOLUTION INTRAVENOUS CONTINUOUS
Status: DISCONTINUED | OUTPATIENT
Start: 2019-06-09 | End: 2019-06-10

## 2019-06-09 RX ORDER — IOPAMIDOL 510 MG/ML
INJECTION, SOLUTION INTRAVASCULAR PRN
Status: DISCONTINUED | OUTPATIENT
Start: 2019-06-09 | End: 2019-06-09 | Stop reason: HOSPADM

## 2019-06-09 RX ORDER — FENTANYL CITRATE 50 UG/ML
25-50 INJECTION, SOLUTION INTRAMUSCULAR; INTRAVENOUS
Status: DISCONTINUED | OUTPATIENT
Start: 2019-06-09 | End: 2019-06-09 | Stop reason: HOSPADM

## 2019-06-09 RX ORDER — NALOXONE HYDROCHLORIDE 0.4 MG/ML
.1-.4 INJECTION, SOLUTION INTRAMUSCULAR; INTRAVENOUS; SUBCUTANEOUS
Status: DISCONTINUED | OUTPATIENT
Start: 2019-06-09 | End: 2019-06-09

## 2019-06-09 RX ORDER — HYDROMORPHONE HYDROCHLORIDE 1 MG/ML
.3-.5 INJECTION, SOLUTION INTRAMUSCULAR; INTRAVENOUS; SUBCUTANEOUS EVERY 5 MIN PRN
Status: DISCONTINUED | OUTPATIENT
Start: 2019-06-09 | End: 2019-06-09 | Stop reason: HOSPADM

## 2019-06-09 RX ORDER — ONDANSETRON 2 MG/ML
4 INJECTION INTRAMUSCULAR; INTRAVENOUS EVERY 30 MIN PRN
Status: DISCONTINUED | OUTPATIENT
Start: 2019-06-09 | End: 2019-06-09 | Stop reason: HOSPADM

## 2019-06-09 RX ORDER — MEPERIDINE HYDROCHLORIDE 25 MG/ML
12.5 INJECTION INTRAMUSCULAR; INTRAVENOUS; SUBCUTANEOUS EVERY 5 MIN PRN
Status: DISCONTINUED | OUTPATIENT
Start: 2019-06-09 | End: 2019-06-09 | Stop reason: HOSPADM

## 2019-06-09 RX ORDER — POLYETHYLENE GLYCOL 3350 17 G/17G
17 POWDER, FOR SOLUTION ORAL DAILY PRN
Status: DISCONTINUED | OUTPATIENT
Start: 2019-06-09 | End: 2019-06-10 | Stop reason: HOSPADM

## 2019-06-09 RX ORDER — ONDANSETRON 4 MG/1
4 TABLET, ORALLY DISINTEGRATING ORAL EVERY 30 MIN PRN
Status: DISCONTINUED | OUTPATIENT
Start: 2019-06-09 | End: 2019-06-10 | Stop reason: HOSPADM

## 2019-06-09 RX ORDER — SODIUM CHLORIDE, SODIUM LACTATE, POTASSIUM CHLORIDE, CALCIUM CHLORIDE 600; 310; 30; 20 MG/100ML; MG/100ML; MG/100ML; MG/100ML
INJECTION, SOLUTION INTRAVENOUS CONTINUOUS
Status: DISCONTINUED | OUTPATIENT
Start: 2019-06-09 | End: 2019-06-09 | Stop reason: HOSPADM

## 2019-06-09 RX ORDER — SCOLOPAMINE TRANSDERMAL SYSTEM 1 MG/1
1 PATCH, EXTENDED RELEASE TRANSDERMAL ONCE
Status: COMPLETED | OUTPATIENT
Start: 2019-06-09 | End: 2019-06-09

## 2019-06-09 RX ORDER — SODIUM CHLORIDE, SODIUM LACTATE, POTASSIUM CHLORIDE, CALCIUM CHLORIDE 600; 310; 30; 20 MG/100ML; MG/100ML; MG/100ML; MG/100ML
INJECTION, SOLUTION INTRAVENOUS CONTINUOUS PRN
Status: DISCONTINUED | OUTPATIENT
Start: 2019-06-09 | End: 2019-06-09

## 2019-06-09 RX ORDER — ONDANSETRON 2 MG/ML
4 INJECTION INTRAMUSCULAR; INTRAVENOUS EVERY 30 MIN PRN
Status: DISCONTINUED | OUTPATIENT
Start: 2019-06-09 | End: 2019-06-09 | Stop reason: CLARIF

## 2019-06-09 RX ORDER — NALOXONE HYDROCHLORIDE 0.4 MG/ML
.1-.4 INJECTION, SOLUTION INTRAMUSCULAR; INTRAVENOUS; SUBCUTANEOUS
Status: DISCONTINUED | OUTPATIENT
Start: 2019-06-09 | End: 2019-06-10 | Stop reason: HOSPADM

## 2019-06-09 RX ORDER — LIDOCAINE 40 MG/G
CREAM TOPICAL
Status: DISCONTINUED | OUTPATIENT
Start: 2019-06-09 | End: 2019-06-10 | Stop reason: HOSPADM

## 2019-06-09 RX ORDER — OXYCODONE HYDROCHLORIDE 5 MG/1
5 TABLET ORAL EVERY 4 HOURS PRN
Status: DISCONTINUED | OUTPATIENT
Start: 2019-06-09 | End: 2019-06-10 | Stop reason: HOSPADM

## 2019-06-09 RX ORDER — SCOLOPAMINE TRANSDERMAL SYSTEM 1 MG/1
1 PATCH, EXTENDED RELEASE TRANSDERMAL ONCE
Status: DISCONTINUED | OUTPATIENT
Start: 2019-06-09 | End: 2019-06-09

## 2019-06-09 RX ORDER — OXYCODONE HCL 5 MG/5 ML
5 SOLUTION, ORAL ORAL EVERY 4 HOURS PRN
Status: DISCONTINUED | OUTPATIENT
Start: 2019-06-09 | End: 2019-06-09

## 2019-06-09 RX ORDER — SODIUM CHLORIDE, SODIUM LACTATE, POTASSIUM CHLORIDE, CALCIUM CHLORIDE 600; 310; 30; 20 MG/100ML; MG/100ML; MG/100ML; MG/100ML
INJECTION, SOLUTION INTRAVENOUS CONTINUOUS
Status: DISCONTINUED | OUTPATIENT
Start: 2019-06-09 | End: 2019-06-09

## 2019-06-09 RX ORDER — FENTANYL CITRATE 50 UG/ML
INJECTION, SOLUTION INTRAMUSCULAR; INTRAVENOUS PRN
Status: DISCONTINUED | OUTPATIENT
Start: 2019-06-09 | End: 2019-06-09

## 2019-06-09 RX ORDER — LABETALOL 20 MG/4 ML (5 MG/ML) INTRAVENOUS SYRINGE
10
Status: DISCONTINUED | OUTPATIENT
Start: 2019-06-09 | End: 2019-06-09 | Stop reason: HOSPADM

## 2019-06-09 RX ORDER — FENTANYL CITRATE 50 UG/ML
25-50 INJECTION, SOLUTION INTRAMUSCULAR; INTRAVENOUS
Status: DISCONTINUED | OUTPATIENT
Start: 2019-06-09 | End: 2019-06-09 | Stop reason: CLARIF

## 2019-06-09 RX ORDER — HYDRALAZINE HYDROCHLORIDE 20 MG/ML
2.5-5 INJECTION INTRAMUSCULAR; INTRAVENOUS EVERY 10 MIN PRN
Status: DISCONTINUED | OUTPATIENT
Start: 2019-06-09 | End: 2019-06-10 | Stop reason: HOSPADM

## 2019-06-09 RX ORDER — PROPOFOL 10 MG/ML
INJECTION, EMULSION INTRAVENOUS CONTINUOUS PRN
Status: DISCONTINUED | OUTPATIENT
Start: 2019-06-09 | End: 2019-06-09

## 2019-06-09 RX ORDER — ONDANSETRON 4 MG/1
4 TABLET, ORALLY DISINTEGRATING ORAL EVERY 30 MIN PRN
Status: DISCONTINUED | OUTPATIENT
Start: 2019-06-09 | End: 2019-06-09 | Stop reason: HOSPADM

## 2019-06-09 RX ORDER — CITRIC ACID/SODIUM CITRATE 334-500MG
30 SOLUTION, ORAL ORAL
Status: DISCONTINUED | OUTPATIENT
Start: 2019-06-09 | End: 2019-06-09

## 2019-06-09 RX ORDER — MEPERIDINE HYDROCHLORIDE 50 MG/ML
12.5 INJECTION INTRAMUSCULAR; INTRAVENOUS; SUBCUTANEOUS EVERY 5 MIN PRN
Status: DISCONTINUED | OUTPATIENT
Start: 2019-06-09 | End: 2019-06-09 | Stop reason: CLARIF

## 2019-06-09 RX ORDER — ONDANSETRON 2 MG/ML
INJECTION INTRAMUSCULAR; INTRAVENOUS PRN
Status: DISCONTINUED | OUTPATIENT
Start: 2019-06-09 | End: 2019-06-09

## 2019-06-09 RX ORDER — CITRIC ACID/SODIUM CITRATE 334-500MG
30 SOLUTION, ORAL ORAL
Status: COMPLETED | OUTPATIENT
Start: 2019-06-09 | End: 2019-06-09

## 2019-06-09 RX ADMIN — ONDANSETRON 4 MG: 2 INJECTION INTRAMUSCULAR; INTRAVENOUS at 08:48

## 2019-06-09 RX ADMIN — ONDANSETRON 4 MG: 2 INJECTION INTRAMUSCULAR; INTRAVENOUS at 14:06

## 2019-06-09 RX ADMIN — FENTANYL CITRATE 100 MCG: 50 INJECTION, SOLUTION INTRAMUSCULAR; INTRAVENOUS at 14:28

## 2019-06-09 RX ADMIN — FENTANYL CITRATE 50 MCG: 50 INJECTION, SOLUTION INTRAMUSCULAR; INTRAVENOUS at 14:57

## 2019-06-09 RX ADMIN — PROPOFOL 150 MCG/KG/MIN: 10 INJECTION, EMULSION INTRAVENOUS at 14:33

## 2019-06-09 RX ADMIN — SENNOSIDES AND DOCUSATE SODIUM 1 TABLET: 8.6; 5 TABLET ORAL at 11:04

## 2019-06-09 RX ADMIN — ONDANSETRON 4 MG: 2 INJECTION INTRAMUSCULAR; INTRAVENOUS at 14:58

## 2019-06-09 RX ADMIN — SENNOSIDES AND DOCUSATE SODIUM 1 TABLET: 8.6; 5 TABLET ORAL at 19:44

## 2019-06-09 RX ADMIN — PROPOFOL 150 MG: 10 INJECTION, EMULSION INTRAVENOUS at 14:28

## 2019-06-09 RX ADMIN — CYCLOBENZAPRINE HYDROCHLORIDE 5 MG: 5 TABLET, FILM COATED ORAL at 23:26

## 2019-06-09 RX ADMIN — SCOPALAMINE 1 PATCH: 1 PATCH, EXTENDED RELEASE TRANSDERMAL at 14:00

## 2019-06-09 RX ADMIN — SODIUM CHLORIDE, POTASSIUM CHLORIDE, SODIUM LACTATE AND CALCIUM CHLORIDE: 600; 310; 30; 20 INJECTION, SOLUTION INTRAVENOUS at 14:00

## 2019-06-09 RX ADMIN — SODIUM CHLORIDE, POTASSIUM CHLORIDE, SODIUM LACTATE AND CALCIUM CHLORIDE: 600; 310; 30; 20 INJECTION, SOLUTION INTRAVENOUS at 19:56

## 2019-06-09 RX ADMIN — Medication 100 MG: at 14:28

## 2019-06-09 RX ADMIN — SODIUM CITRATE AND CITRIC ACID MONOHYDRATE 30 ML: 500; 334 SOLUTION ORAL at 13:51

## 2019-06-09 RX ADMIN — SCOPALAMINE 1 PATCH: 1 PATCH, EXTENDED RELEASE TRANSDERMAL at 13:51

## 2019-06-09 RX ADMIN — ACETAMINOPHEN 650 MG: 325 TABLET, FILM COATED ORAL at 11:04

## 2019-06-09 RX ADMIN — MIDAZOLAM 2 MG: 1 INJECTION INTRAMUSCULAR; INTRAVENOUS at 14:21

## 2019-06-09 RX ADMIN — SUGAMMADEX 150 MG: 100 INJECTION, SOLUTION INTRAVENOUS at 15:04

## 2019-06-09 RX ADMIN — ROCURONIUM BROMIDE 30 MG: 10 INJECTION INTRAVENOUS at 14:40

## 2019-06-09 RX ADMIN — ACETAMINOPHEN 650 MG: 325 TABLET, FILM COATED ORAL at 07:04

## 2019-06-09 RX ADMIN — PROPOFOL 100 MCG/KG/MIN: 10 INJECTION, EMULSION INTRAVENOUS at 15:00

## 2019-06-09 RX ADMIN — SODIUM CHLORIDE, POTASSIUM CHLORIDE, SODIUM LACTATE AND CALCIUM CHLORIDE: 600; 310; 30; 20 INJECTION, SOLUTION INTRAVENOUS at 14:28

## 2019-06-09 ASSESSMENT — PAIN DESCRIPTION - DESCRIPTORS
DESCRIPTORS: SORE;DISCOMFORT;CONSTANT
DESCRIPTORS: ACHING;CRAMPING;SORE
DESCRIPTORS: ACHING;CRAMPING

## 2019-06-09 ASSESSMENT — ACTIVITIES OF DAILY LIVING (ADL)
ADLS_ACUITY_SCORE: 10

## 2019-06-09 NOTE — PLAN OF CARE
"AVSS.  Pain and intermitt nausea continues - pt declined tylenol at 0230 \"don't wake me if I'm sleeping\", declined hot packs and cold packs.  Pt agreeable to fan in room to help with int nausea.  Pt sleeping with Q2hr checks.   at bedside.  SL.  NPO for possible procedure this morning (ERCP) - GI to consult.  Old lap sites with dermabond, c/d/intact, some bruising.  Pt reports no BM since 6/4, denies flatus.  Up independently in room.  Voiding, not saving.  Pt refused low bed - states she is more comfortable with high bed.  Educated pt and spouse about possible risks of falls - pt refused to have low bed.    Cont with POC.      "

## 2019-06-09 NOTE — PLAN OF CARE
"/80 (BP Location: Right arm)   Pulse 81   Temp 98.1  F (36.7  C) (Oral)   Resp 14   Ht 1.651 m (5' 5\")   SpO2 97%   BMI 28.46 kg/m      Writer assumed care of pt at 2000    VSS. Back/flank pain controlled with PRN Tylenol. Voids spont. UAL in room. Reg diet until 0000, at which time she will be NPO for procedure tomorrow. Reports severe N/V with opiates. None given this shift. Reports no BM since procedure 6/4. Has passed gas \"maybe four times\" since then as well. PIV SL. Four lap sites on abdomen with dermabond C/D/Intact with some ecchymosis.  at bedside, supportive. Resting comfortably between cares. Continue POC.   "

## 2019-06-09 NOTE — CONSULTS
"Brief GI Consult Note    38 yo F with persistent abdominal pain s/p lap jersey on 6/4. Labs with elevated bili to 5.6 and elevated ALKphos/transaminases. CT with \"mild to moderate intrahepatic and extrahepatic biliary ductal dilatation, with narrowing of the extrahepatic biliary duct near the duodenal ampulla,\" concerning for retained CBD stone. No fevers, normal WBCs. Normal lipase.     Plan:   - NPO  - Plan for ERCP this afternoon, further recommendations to follow    Discussed with Dr. Zeke Pedroza MD  GI Fellow  p [phone]    "

## 2019-06-09 NOTE — H&P
[facility]    History and Physical - Hospitalist Service, [facility]       Date of Admission:  6/8/2019    Assessment & Plan   Stephania Barahona is a 39 year old female admitted on 6/8/2019. She has a history of recent cholecystitis with laparoscopic cholecystectomy on 6/4 and now presents with RUQ pain, bloating and bilirubin elevation.    1) Hyperbilirubinemia, RUQ pain post cholecystectomy - Underwent laparoscopic cholecystectomy on 6/4 for suspected acute cholecystitis.  Did well initially but yesterday and today has had more RUQ pain, bloating and pain with inspiration.  Given time course suspect stone rather than bile duct injury.  No fevers to suggest cholangitis.  - CT scan shows mild to moderate intrahepatic and extrahepatic biliary ductal dilation.  - Transferred her for GI consultation and likely ERCP  - NPO after midnight  - Hydromorphone, tylenol PRN pain     Diet: Regular, NPO midnight  DVT Prophylaxis: Pneumatic Compression Devices  Wallace Catheter: not present  Code Status: Full    Disposition Plan   Expected discharge: 2 - 3 days, recommended to prior living arrangement once seen by GI.  Entered: LINDA Rowland CNP 06/08/2019, 8:51 PM     The patient's care was discussed with the Attending Physician, Dr. Smith.    LINDA Rowland CNP  [facility]  Pager: [phone]  Please see sticky note for cross cover information  ______________________________________________________________________    Chief Complaint   RUQ pain, bloating    History is obtained from the patient    History of Present Illness   Stephania Barahona is a 39 year old female admitted on 6/8/2019. She has a history of recent cholecystitis with laparoscopic cholecystectomy on 6/4 and now presents with RUQ pain, bloating and bilirubin elevation.    The patient reports that she underwent cholecystectomy on 6/4.  Post op she initially did well and managed  her pain with percocet, but over the past two days he pain has worsened in the RUQ and she has noted bloating.  At first she attributed this to constipation but when she contacted her surgery team she was advised to come into the hospital.    She reports significant nausea with opiates.    Review of Systems    The 10 point Review of Systems is negative other than noted in the HPI or here.     Past Medical History    I have reviewed this patient's medical history and updated it with pertinent information if needed.   Past Medical History:   Diagnosis Date     Motion sickness      NO ACTIVE PROBLEMS        Past Surgical History   I have reviewed this patient's surgical history and updated it with pertinent information if needed.  Past Surgical History:   Procedure Laterality Date     C NONSPECIFIC PROCEDURE  2007    lumbar fracture     D & C  03/25/11     LAPAROSCOPIC CHOLECYSTECTOMY N/A 6/4/2019    Procedure: CHOLECYSTECTOMY, LAPAROSCOPIC;  Surgeon: Jaiden Smith MD;  Location: [facility] OR     TONSILLECTOMY  1991       Social History   I have reviewed this patient's social history and updated it with pertinent information if needed.  Social History     Tobacco Use     Smoking status: Current Every Day Smoker     Smokeless tobacco: Never Used     Tobacco comment: 2004   Substance Use Topics     Alcohol use: No     Comment: rarely     Drug use: No       Family History   I have reviewed this patient's family history and updated it with pertinent information if needed.   Family History   Problem Relation Age of Onset     Asthma Sister      Diabetes Sister         gestational diabetes     Asthma Sister      Asthma Brother      Psychotic Disorder Maternal Grandfather      Depression Maternal Grandfather      Alcohol/Drug Maternal Grandfather      Alzheimer Disease Paternal Grandfather         Dementia     Prior to Admission Medications   Prior to Admission Medications   Prescriptions Last Dose Informant Patient Reported?  Taking?   HYDROcodone-acetaminophen (NORCO) 5-325 MG tablet   No No   Sig: Take 1-2 tablets by mouth every 6 hours as needed for pain   cyclobenzaprine (FLEXERIL) 10 MG tablet   No No   Sig: TAKE 1/2 - 1 TABLET EVERY NIGHT AT BEDTIME   ibuprofen (ADVIL/MOTRIN) 200 MG tablet   Yes No   Sig: Take 800 mg by mouth every 4 hours as needed for mild pain   ondansetron (ZOFRAN-ODT) 4 MG ODT tab   No No   Sig: Take 1-2 tablets (4-8 mg) by mouth every 8 hours as needed for nausea   oxyCODONE-acetaminophen (PERCOCET) 5-325 MG tablet   No No   Sig: Take 1-2 tablets by mouth every 6 hours as needed for moderate to severe pain      Facility-Administered Medications: None     Allergies   Allergies   Allergen Reactions     Clindamycin/Lincomycin Nausea     Nubain [Nalbuphine Hcl] Nausea and Vomiting     And dizziness       Physical Exam   Vital Signs: Temp: 97.2  F (36.2  C) Temp src: Oral BP: 110/77 Pulse: 86     SpO2: 96 % O2 Device: None (Room air)      Physical Exam   Constitutional:   Well nourished, well developed, resting comfortably   Head: Normocephalic and atraumatic.   Eyes: Conjunctivae are normal. Pupils are equal, round, and reactive to light.  Pharynx has no erythema or exudate, mucous membranes are moist  Neck:   No adenopathy, no bony tenderness  Cardiovascular: Regular rate and rhythm without murmurs or gallops  Pulmonary/Chest: Clear to auscultation bilaterally, with no wheezes or retractions. No respiratory distress.  GI: Soft with good bowel sounds.  RUQ tenderness.  Musculoskeletal:  No edema or clubbing   Skin: Skin is warm and dry. No rash noted.   Neurological: Alert and oriented to person, place, and time. Nonfocal exam  Psychiatric:  Normal mood and affect.      Data   Data reviewed today: I reviewed all medications, new labs and imaging results over the last 24 hours. I personally reviewed her surgery note, her labs today and her CT scan.

## 2019-06-09 NOTE — OP NOTE
ERCP 06/09/2019  2:15 PM [facility], [address] [place] [address] [phone]     Endoscopy Department   _______________________________________________________________________________   Patient Name: Stephania Barahona             Procedure Date: 6/9/2019 2:15 PM   MRN: [id number]                       Account Number: [id number]   YOB: 1980              Admit Type: Inpatient   Age: 39                                Gender: Female   Note Status: Finalized                Attending MD: Meek Alanis MD   Total Sedation Time:                     _______________________________________________________________________________       Procedure:           ERCP   Indications:         Suspected bile duct stone(s), Jaundice, Suspected                        complication of previous biliary surgery   Providers:           Meek Alanis MD   Patient Profile:     Ms Barahona is a 40yo woman with a recent history of                        cholecystectomy who presents on transfer with suspect                        retained stone demonstrated by rising liver studies                        (Tblli >6 and RUQ pain) for evaluation and management by                        ERCP.   Referring MD:        Jaiden Young MD   Medicines:           General Anesthesia, Indomethacin 100 mg DC   Complications:       No immediate complications.   _______________________________________________________________________________   Procedure:           Pre-Anesthesia Assessment:                        - Prior to the procedure, a History and Physical was                        performed, and patient medications and allergies were                        reviewed. The patient is competent. The risks and                        benefits of the procedure and the sedation options and                        risks were discussed with the patient. All questions                        were  answered and informed consent was obtained. Patient                        identification and proposed procedure were verified by                        the nurse in the pre-procedure area. Mental Status                        Examination: alert and oriented. Airway Examination:                        Mallampati Class II (the uvula but not tonsillar pillars                        visualized). Respiratory Examination: clear to                        auscultation. CV Examination: normal. ASA Grade                        Assessment: II - A patient with mild systemic disease.                        After reviewing the risks and benefits, the patient was                        deemed in satisfactory condition to undergo the                        procedure. The anesthesia plan was to use general                        anesthesia. Immediately prior to administration of                        medications, the patient was re-assessed for adequacy to                        receive sedatives. The heart rate, respiratory rate,                        oxygen saturations, blood pressure, adequacy of                        pulmonary ventilation, and response to care were                        monitored throughout the procedure. The physical status                        of the patient was re-assessed after the procedure.                        After obtaining informed consent, the scope was passed                        under direct vision. Throughout the procedure, the                        patient's blood pressure, pulse, and oxygen saturations                        were monitored continuously. The duodenoscope was                        introduced through the mouth, and used to inject                        contrast into and used to inject contrast into the bile                        duct. The ERCP was accomplished without difficulty. The                        patient tolerated the procedure well.                            "                                                          Findings:        A  film of the right upper quadrant demonstrated the        cholecystectomy clips. Limited white light views of the foregut were        unremarkable, with normal appearing ampulla. The bile duct was        slectively deeply cannulated with the short-nosed traction        sphincterotome in concert with an 0.025\" Visiglide wire. Contrast was        injected and I personally interpreted the bile duct images. Ductal flow        of contrast was adequate, image quality was excellent and contrast        extended to the intrahepatics. The intrahepatics were decompressed and        of reasonable concentration. The bifurcation and common duct were mildly        centrally dilated with smooth distal, tight taper at the ampulla,        suggestive of benign ampullary stenosis. A 6 mm biliary sphincterotomy        was made with a monofilament traction (standard) sphincterotome using        ERBE electrocautery. There was self limited oozing from the        sphincterotomy which did not require treatment. To discover objects, the        biliary tree was swept with a 12 mm balloon starting at the bifurcation.        Nothing beyond bile and contrast were recovered. A 10 Fr by 7 cm SF        stent with a single external flap and a single internal flap was placed        7 cm into the common bile duct. Bile flowed through the stent and the        stent was in good position. The ventral pancreatic duct and orifice were        selectively not interrogated.                                                                                     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 with near resolution                        - No evidence of choledocholithiasis, though there is                        the possibility of spontaneous passage despite our trend                        of rising " liver studies                        - Empiric stent placement given rising bilirubin and                        possibility of small intrahepatic leak (though no                        extravasation was visualized despite pressure injection)   Recommendation:      - General anesthsia recovery with return to the floor                        when appropriate                        - With continued rise of liver studies, alternative                        cellular etiologies must be considered                        - Avoid antithrombotic therapies for at least three days                        - Repeat ERCP in 8 weeks with interventions dependent on                        interval course                        - The findings and recommendations were discussed with                        the patient and their family                                                                                       electronically signed by ARNALDO Alanis

## 2019-06-09 NOTE — PLAN OF CARE
VSS. Reports abdominal pain not well managed with Tylenol - declines any additional interventions. Patient rested much of the morning, took pre-op shower independently. Had formed, grey/brown, fatty bowel movement. Denies any relief in abdominal pain. NPO for ERCP.     Off unit for MRCP at 1340, report given to 3C RN.

## 2019-06-09 NOTE — PROGRESS NOTES
[facility], [place]    Internal Medicine Progress Note - Gold Service      Assessment & Plan   Stephania Barahona is a 39 year old woman with a history of suspected acute cholecystitis s/p laparoscopic cholecystectomy on 6/4 who was admitted on 6/8 w/ RUQ pain, hyperbilirubinemia and elevated alk phos/transaminases concerning for retained CBD stone.      #RUQ Pain and Abnormal LFTs s/p Cholecystectomy. Lap jersey 6/4 showed distended thickened gallbladder w/ hydrops and stones. Discharged but then developed RUQ pain. Today T bili 5.6 --> 6.2, alk phos 319-->328, -->253, AST 88-->75. Lipase wnl. CT with mild to moderate intrahepatic and extrahepatic biliary ductal dilatation, with narrowing of the extrahepatic biliary duct near the duodenal ampulla. Concerning for retained CBD stone. Underwent ERCP today w/ benign appearing ampullary stenosis s/p biliary sphincterotomy w/ near resolution; no evidence of choledocholithiasis but possibility of spontaneous passage despite up trending LFTs. Had empiric stent placement given rising bilirubin and possibility of small intrahepatic leak (though no extravasation was visualized despite pressure injection). Considering alternative cellular etiologies w/ continued uptrend of liver studies today.   - GI following.   - Trend hepatic panel in AM.   - Added on viral studies (hep A/B/C, HIV, EBV).   - No NSAIDs or antithrombotic therapy following sphincterotomy.   - Poor opioid tolerance so prefers to avoid. PRN Tylenol and antiemetics available.   - Bowel regimen started. Had BM.  - Anticipating repeat ERCP in 8 weeks w/ interventions dependent on interval course.     Consulting Services: GI    Diet: diet as tolerated once back to floor   Fluids: LR @ 100 ml/hr  DVT Prophylaxis: Mechanical   Code Status: Full Code    Disposition Plan   Expected discharge: 1-2 days; recommended to prior living arrangement once cleared by GI, LFTs down trending     The  "patient's care was discussed with bedside RN Elli and medicine attending Dr. Dewitt.     Ermelinda Aldana PA-C  Hospitalist Service  [facility]  Pager: [phone]    Interval History   Stephania is having abdominal pain. She does not tolerate opioids due to severe nausea so does not want to take these. She has not had a BM in almost a week. Mild nausea. No f/c.     A 4 point ROS was performed (including CV, Resp, GI, ) and negative unless otherwise noted in HPI.     Physical Exam   /84   Pulse 79   Temp 99.3  F (37.4  C)   Resp 18   Ht 1.651 m (5' 5\")   SpO2 100%   BMI 28.46 kg/m       GENERAL: Alert and oriented x 3. Lying in bed, appears comfortable. Conversant.  at bedside.   HEENT: Anicteric sclera. Mucous membranes moist.   CV: RRR. S1, S2. No murmurs appreciated.   RESPIRATORY: Effort normal on room air. Lungs CTAB with no wheezing, rales, rhonchi.   GI: Abdomen soft and non distended, bowel sounds present. + RUQ tenderness.  NEUROLOGICAL: No focal deficits. Moves all extremities.    EXTREMITIES: No peripheral edema. Intact bilateral pedal pulses.   SKIN: No jaundice. No rashes.     Lines/Tubes/Drains  PIV     Data reviewed today: I reviewed all medications, new labs and imaging results over the last 24 hours.                   "

## 2019-06-09 NOTE — ANESTHESIA CARE TRANSFER NOTE
Patient: Stephania Barahona    Procedure(s):  ENDOSCOPIC RETROGRADE CHOLANGIOPANCREATOGRAPHY with biliary sphincterotomy, biliary stent placement    Diagnosis: bile duct obstruction  Diagnosis Additional Information: No value filed.    Anesthesia Type:   No value filed.     Note:  Airway :Face Mask  Patient transferred to:PACU  Comments: Airway :Face Mask  Patient transferred to:PACU  Comments: Prior to extubation, patient was breathing spontaneously with appropriate respiratory rate and tidal volume. The patient was following commands, warm and demonstrated adequate strength. Patient was suctioned and extubated without complication.   Transported to PACU on 6L O2 via face mask.   VSS upon arrival to PACU.  Patient denies nausea or pain at this time.   Care transfer plan communicated and patient care transferred to PACU ALVIN Tinoco MD  Anesthesia Resident - CA1  6/9/2019  3:28 PMHandoff Report: Identifed the Patient, Identified the Reponsible Provider, Reviewed the pertinent medical history, Discussed the surgical course, Reviewed Intra-OP anesthesia mangement and issues during anesthesia, Set expectations for post-procedure period and Allowed opportunity for questions and acknowledgement of understanding      Vitals: (Last set prior to Anesthesia Care Transfer)    CRNA VITALS  6/9/2019 1445 - 6/9/2019 1528      6/9/2019             Resp Rate (observed):  7  (Abnormal)                 Electronically Signed By: Lean Tinoco MD  June 9, 2019  3:28 PM

## 2019-06-09 NOTE — ANESTHESIA POSTPROCEDURE EVALUATION
Anesthesia POST Procedure Evaluation    Patient: Stephania Barahona   MRN:     [id number] Gender:   female   Age:    39 year old :      1980        Preoperative Diagnosis: bile duct obstruction   Procedure(s):  ENDOSCOPIC RETROGRADE CHOLANGIOPANCREATOGRAPHY with biliary sphincterotomy, biliary stent placement   Postop Comments: No value filed.       Anesthesia Type:  General  No value filed.    Reportable Event: NO     PAIN: Uncomplicated   Sign Out status: Comfortable, Well controlled pain     PONV: No PONV   Sign Out status:  No Nausea or Vomiting     Neuro/Psych: Uneventful perioperative course   Sign Out Status: Preoperative baseline; Age appropriate mentation     Airway/Resp.: Uneventful perioperative course   Sign Out Status: Non labored breathing, age appropriate RR; Resp. Status within EXPECTED Parameters     CV: Uneventful perioperative course   Sign Out status: Appropriate BP and perfusion indices; Appropriate HR/Rhythm     Disposition:   Sign Out in:  PACU  Disposition:  Floor  Recovery Course: Uneventful  Follow-Up: Not required           Last Anesthesia Record Vitals:  CRNA VITALS  2019 1445 - 2019 1545      2019             Resp Rate (observed):  7  (Abnormal)           Last PACU Vitals:  Vitals Value Taken Time   BP 96/80 2019  3:50 PM   Temp 36.8  C (98.2  F) 2019  3:45 PM   Pulse 80 2019  3:50 PM   Resp 16 2019  3:45 PM   SpO2 100 % 2019  3:50 PM   Temp src     NIBP     Pulse     SpO2     Resp     Temp     Ht Rate     Temp 2     Vitals shown include unvalidated device data.      Electronically Signed By: Kareem Ro MD, 2019, 4:18 PM

## 2019-06-09 NOTE — ANESTHESIA PREPROCEDURE EVALUATION
Anesthesia Pre-Procedure Evaluation    Patient: Stephania Barahona   MRN:     [id number] Gender:   female   Age:    39 year old :      1980        Preoperative Diagnosis: bile duct obstruction   Procedure(s):  ENDOSCOPIC RETROGRADE CHOLANGIOPANCREATOGRAPHY     Past Medical History:   Diagnosis Date     Motion sickness      NO ACTIVE PROBLEMS       Past Surgical History:   Procedure Laterality Date     C NONSPECIFIC PROCEDURE      lumbar fracture     D & C  11     LAPAROSCOPIC CHOLECYSTECTOMY N/A 2019    Procedure: CHOLECYSTECTOMY, LAPAROSCOPIC;  Surgeon: Jaiden Smith MD;  Location: [facility] OR     TONSILLECTOMY            Anesthesia Evaluation     . Pt has had prior anesthetic. Type: General    No history of anesthetic complications          ROS/MED HX    ENT/Pulmonary:  - neg pulmonary ROS     Neurologic:  - neg neurologic ROS     Cardiovascular:     (+) Dyslipidemia, ----. : . . . :. .       METS/Exercise Tolerance:  >4 METS   Hematologic:  - neg hematologic  ROS       Musculoskeletal:  - neg musculoskeletal ROS       GI/Hepatic:     (+) Other GI/Hepatic Hyperbilirubinemia, RUQ pain post cholecystectomy      Renal/Genitourinary:  - ROS Renal section negative       Endo:     (+) Obesity, .      Psychiatric:  - neg psychiatric ROS       Infectious Disease:  - neg infectious disease ROS       Malignancy:      - no malignancy   Other:    (+) C-spine cleared: N/A, no H/O Chronic Pain,no other significant disability                        PHYSICAL EXAM:   Mental Status/Neuro: A/A/O   Airway: Facies: Feasible  Mallampati: I  Mouth/Opening: Full  TM distance: > 6 cm  Neck ROM: Full   Respiratory:   Resp. Rate: Normal     Resp. Effort: Normal      CV: Rhythm: Regular  Heart: Normal Sounds  Pulses: Normal   Comments:                    Lab Results   Component Value Date    WBC 5.4 2019    HGB 14.6 2019    HCT 42.6 2019     2019    CRP <2.9 2017     2019     "POTASSIUM 3.9 06/09/2019    CHLORIDE 104 06/09/2019    CO2 26 06/09/2019    BUN 7 06/09/2019    CR 0.68 06/09/2019    GLC 92 06/09/2019    TAMIA 9.0 06/09/2019    ALBUMIN 3.3 (L) 06/09/2019    PROTTOTAL 6.6 (L) 06/09/2019     (H) 06/09/2019    AST 75 (H) 06/09/2019    ALKPHOS 328 (H) 06/09/2019    BILITOTAL 6.2 (H) 06/09/2019    LIPASE 156 06/08/2019    INR 0.96 06/09/2019    TSH 1.48 10/11/2017    HCG Negative 06/04/2019       Preop Vitals  BP Readings from Last 3 Encounters:   06/09/19 121/64   06/08/19 110/70   06/04/19 119/85    Pulse Readings from Last 3 Encounters:   06/09/19 63   06/04/19 86   07/12/18 112      Resp Readings from Last 3 Encounters:   06/09/19 16   06/08/19 18   06/04/19 16    SpO2 Readings from Last 3 Encounters:   06/09/19 96%   06/08/19 98%   06/04/19 98%      Temp Readings from Last 1 Encounters:   06/09/19 36.6  C (97.8  F) (Oral)    Ht Readings from Last 1 Encounters:   06/08/19 1.651 m (5' 5\")      Wt Readings from Last 1 Encounters:   06/08/19 77.6 kg (171 lb)    Estimated body mass index is 28.46 kg/m  as calculated from the following:    Height as of this encounter: 1.651 m (5' 5\").    Weight as of an earlier encounter on 6/8/19: 77.6 kg (171 lb).     LDA:  Peripheral IV 06/08/19 Right Upper forearm (Active)   Site Assessment WDL 6/9/2019 10:00 AM   Line Status Saline locked 6/9/2019 10:00 AM   Phlebitis Scale 0-->no symptoms 6/9/2019 10:00 AM   Infiltration Scale 0 6/9/2019 10:00 AM   Infiltration Site Treatment Method  None 6/9/2019 10:00 AM   Extravasation? No 6/9/2019 10:00 AM   Dressing Intervention New dressing  6/8/2019  2:34 PM   Number of days: 1            Assessment:   ASA SCORE: 1 emergent     NPO Status: > 6 hours since completed Solid Foods   Documentation: H&P complete; Consents complete   Proceeding: Proceed without further delay  Tobacco Use:  NO Active use of Tobacco/UNKNOWN Tobacco use status     Plan:   Anes. Type:  General   Pre-Induction: Midazolam IV "   Induction:  IV (RSI)   Airway: Oral ETT   Access/Monitoring: PIV   Maintenance: Balanced   Emergence: Procedure Site   Logistics: Observation/Admission     Postop Pain/Sedation Strategy:  Standard-Options: Opioids PRN     PONV Management:  Adult Risk Factors: Female, Non-Smoker, Postop Opioids  Prevention: Ondansetron; Dexamethasone; Scop. Patch; Propofol Infusion     CONSENT: Direct conversation   Plan and risks discussed with: Patient   Blood Products: Consent Deferred (Minimal Blood Loss)       Comments for Plan/Consent:  Plan:  GA with ETT, routine monitors    MD Lena Galdamez MD

## 2019-06-10 ENCOUNTER — CARE COORDINATION (OUTPATIENT)
Dept: GASTROENTEROLOGY | Facility: CLINIC | Age: 39
End: 2019-06-10

## 2019-06-10 VITALS
RESPIRATION RATE: 15 BRPM | HEIGHT: 65 IN | SYSTOLIC BLOOD PRESSURE: 103 MMHG | TEMPERATURE: 98.1 F | OXYGEN SATURATION: 97 % | BODY MASS INDEX: 28.46 KG/M2 | DIASTOLIC BLOOD PRESSURE: 52 MMHG | HEART RATE: 79 BPM

## 2019-06-10 DIAGNOSIS — K83.1 AMPULLARY STENOSIS (H): Primary | ICD-10-CM

## 2019-06-10 LAB
ALBUMIN SERPL-MCNC: 3 G/DL (ref 3.4–5)
ALP SERPL-CCNC: 277 U/L (ref 40–150)
ALT SERPL W P-5'-P-CCNC: 173 U/L (ref 0–50)
ANION GAP SERPL CALCULATED.3IONS-SCNC: 7 MMOL/L (ref 3–14)
AST SERPL W P-5'-P-CCNC: 27 U/L (ref 0–45)
BILIRUB SERPL-MCNC: 1.6 MG/DL (ref 0.2–1.3)
BUN SERPL-MCNC: 10 MG/DL (ref 7–30)
CALCIUM SERPL-MCNC: 8.6 MG/DL (ref 8.5–10.1)
CHLORIDE SERPL-SCNC: 106 MMOL/L (ref 94–109)
CO2 SERPL-SCNC: 27 MMOL/L (ref 20–32)
CREAT SERPL-MCNC: 0.68 MG/DL (ref 0.52–1.04)
ERYTHROCYTE [DISTWIDTH] IN BLOOD BY AUTOMATED COUNT: 12.9 % (ref 10–15)
GFR SERPL CREATININE-BSD FRML MDRD: >90 ML/MIN/{1.73_M2}
GLUCOSE SERPL-MCNC: 116 MG/DL (ref 70–99)
HAV IGG SER QL IA: NONREACTIVE
HAV IGM SERPL QL IA: NONREACTIVE
HBV CORE AB SERPL QL IA: NONREACTIVE
HBV SURFACE AB SERPL IA-ACNC: 0.16 M[IU]/ML
HBV SURFACE AG SERPL QL IA: NONREACTIVE
HCT VFR BLD AUTO: 38.5 % (ref 35–47)
HCV AB SERPL QL IA: NONREACTIVE
HGB BLD-MCNC: 12.8 G/DL (ref 11.7–15.7)
HIV 1+2 AB+HIV1 P24 AG SERPL QL IA: NONREACTIVE
MCH RBC QN AUTO: 29.4 PG (ref 26.5–33)
MCHC RBC AUTO-ENTMCNC: 33.2 G/DL (ref 31.5–36.5)
MCV RBC AUTO: 89 FL (ref 78–100)
PLATELET # BLD AUTO: 274 10E9/L (ref 150–450)
POTASSIUM SERPL-SCNC: 3.7 MMOL/L (ref 3.4–5.3)
PROT SERPL-MCNC: 6.2 G/DL (ref 6.8–8.8)
RBC # BLD AUTO: 4.35 10E12/L (ref 3.8–5.2)
SODIUM SERPL-SCNC: 140 MMOL/L (ref 133–144)
WBC # BLD AUTO: 10.3 10E9/L (ref 4–11)

## 2019-06-10 PROCEDURE — 99207 ZZC APP CREDIT; MD BILLING SHARED VISIT: CPT | Performed by: PHYSICIAN ASSISTANT

## 2019-06-10 PROCEDURE — 87799 DETECT AGENT NOS DNA QUANT: CPT | Performed by: PHYSICIAN ASSISTANT

## 2019-06-10 PROCEDURE — 86706 HEP B SURFACE ANTIBODY: CPT | Performed by: PHYSICIAN ASSISTANT

## 2019-06-10 PROCEDURE — 85027 COMPLETE CBC AUTOMATED: CPT | Performed by: PHYSICIAN ASSISTANT

## 2019-06-10 PROCEDURE — 36415 COLL VENOUS BLD VENIPUNCTURE: CPT | Performed by: PHYSICIAN ASSISTANT

## 2019-06-10 PROCEDURE — 25000132 ZZH RX MED GY IP 250 OP 250 PS 637: Performed by: INTERNAL MEDICINE

## 2019-06-10 PROCEDURE — 80053 COMPREHEN METABOLIC PANEL: CPT | Performed by: PHYSICIAN ASSISTANT

## 2019-06-10 PROCEDURE — 86709 HEPATITIS A IGM ANTIBODY: CPT | Performed by: PHYSICIAN ASSISTANT

## 2019-06-10 PROCEDURE — 87340 HEPATITIS B SURFACE AG IA: CPT | Performed by: PHYSICIAN ASSISTANT

## 2019-06-10 PROCEDURE — 86708 HEPATITIS A ANTIBODY: CPT | Performed by: PHYSICIAN ASSISTANT

## 2019-06-10 PROCEDURE — 99239 HOSP IP/OBS DSCHRG MGMT >30: CPT | Performed by: INTERNAL MEDICINE

## 2019-06-10 PROCEDURE — 86704 HEP B CORE ANTIBODY TOTAL: CPT | Performed by: PHYSICIAN ASSISTANT

## 2019-06-10 PROCEDURE — 87389 HIV-1 AG W/HIV-1&-2 AB AG IA: CPT | Performed by: PHYSICIAN ASSISTANT

## 2019-06-10 PROCEDURE — 86803 HEPATITIS C AB TEST: CPT | Performed by: PHYSICIAN ASSISTANT

## 2019-06-10 PROCEDURE — 25800030 ZZH RX IP 258 OP 636: Performed by: INTERNAL MEDICINE

## 2019-06-10 PROCEDURE — 25000131 ZZH RX MED GY IP 250 OP 636 PS 637: Performed by: INTERNAL MEDICINE

## 2019-06-10 RX ADMIN — ONDANSETRON 4 MG: 4 TABLET, ORALLY DISINTEGRATING ORAL at 15:05

## 2019-06-10 RX ADMIN — ACETAMINOPHEN 650 MG: 325 TABLET, FILM COATED ORAL at 00:15

## 2019-06-10 RX ADMIN — SODIUM CHLORIDE, POTASSIUM CHLORIDE, SODIUM LACTATE AND CALCIUM CHLORIDE: 600; 310; 30; 20 INJECTION, SOLUTION INTRAVENOUS at 06:19

## 2019-06-10 RX ADMIN — ACETAMINOPHEN 650 MG: 325 TABLET, FILM COATED ORAL at 15:05

## 2019-06-10 RX ADMIN — SENNOSIDES AND DOCUSATE SODIUM 1 TABLET: 8.6; 5 TABLET ORAL at 08:02

## 2019-06-10 ASSESSMENT — PAIN DESCRIPTION - DESCRIPTORS
DESCRIPTORS: ACHING
DESCRIPTORS: ACHING

## 2019-06-10 ASSESSMENT — ACTIVITIES OF DAILY LIVING (ADL)
ADLS_ACUITY_SCORE: 10

## 2019-06-10 NOTE — PLAN OF CARE
"/67 (BP Location: Right arm)   Pulse 79   Temp 97.9  F (36.6  C) (Oral)   Resp 18   Ht 1.651 m (5' 5\")   SpO2 95%   BMI 28.46 kg/m      ERCP completed today, stent placed. Pt arrived on 7C from PACU at 1600.     VSS. Denies pain. Up with SBA in room. Voids spont. Tolerating reg diet with no N/V. +BM. +flatus. Capno in place.  and daughter at bedside, supportive. Pt resting comfortably between cares. Plan: no antithrombotics and repeat ERCP in 3w to remove stent. Pt should be able to discharge home tomorrow. Continue POC.  "

## 2019-06-10 NOTE — PLAN OF CARE
VSS. Tylenol given for headache. Up independently. Voids spontaneously. Tolerating reg diet with no nausea. +BM. +flatus. Patient resting much of the day.   Discharged to home to follow up with additional ERCP. All instructions reviewed with patient. Transported home with family.

## 2019-06-10 NOTE — PROGRESS NOTES
"    GASTROENTEROLOGY PROGRESS NOTE    Date of Admission: 6/8/2019  Reason for Admission: abdominal pain post jersey      ASSESSMENT:  38 yo F with persistent abdominal pain s/p lap jersey on 6/4. Labs with elevated bili to 5.6 and elevated ALKphos/transaminases. CT with \"mild to moderate intrahepatic and extrahepatic biliary ductal dilatation, with narrowing of the extrahepatic biliary duct near the duodenal ampulla,\" concerning for retained CBD stone. No fevers, normal WBCs. Normal lipase. ERCP 6/9 with ampullary stenosis, no evidence of choledocholithiasis, empiric stent placement done given rising bilirubin and possibility of small intrahepatic leak (though no extravasation was visualized). LFT all down trending this morning suggesting either passed stone vs ampullary stenosis. Pain resolved that she presented with. Hoping for discharge today.    RECOMMENDATIONS:  ADAT  PCP to f/u LFT  Analgesia per primary team  OK for discharge from GI standpoint    GI follow up: ERCP in 8 weeks, we will arrange    The patient was discussed and plan agreed upon with GI staff, Dr Soriano.      Patricia Massey PA-C   Advanced Endoscopy/Pancreaticobiliary [facility]  [facility]  Pager *[phone]  _______________________________________________________________      Subjective: Patient seen and examined at 0930. Patient reports that the pain she was transferred for is completely gone after ERCP. No nausea, vomiting or fevers. Tolerating general diet with no increase in pain/bloating. Having loose stools, last was yesterday, nothing this morning.    ROS:   4 pt ROS negative unless noted in subjective.     Objective:  Blood pressure 103/52, pulse 79, temperature 98.1  F (36.7  C), temperature source Oral, resp. rate 15, height 1.651 m (5' 5\"), SpO2 97 %, not currently breastfeeding.  Gen: Sitting in bed. Appears comfortable  HEENT: NCAT. Conjunctiva clear. Sclera anicteric  CV: RRR, Peripheral perfusion " intact  Resp: normal work of breathing  Abd: Soft, NT, ND, no guarding or rebound, +BS. Lap incisions CDI  Msk: no gross deformity  Skin:  no jaundice  Ext: warm, well perfused  Neuro: grossly normal  Mental status/Psych: A&O. Asks/answers questions appropriately       PROCEDURES:  ERCP 6/9  - Benign appearing ampullary stenosis status post                        biliary sphincterotomy with near resolution                        - No evidence of choledocholithiasis, though there is                        the possibility of spontaneous passage despite our trend                        of rising liver studies                        - Empiric stent placement given rising bilirubin and                        possibility of small intrahepatic leak (though no                        extravasation was visualized despite pressure injection)     LABS:  BMP  Recent Labs   Lab 06/10/19  0715 06/09/19  0957 06/08/19  1433 06/04/19  1137    136 140 143   POTASSIUM 3.7 3.9 3.8 3.8   CHLORIDE 106 104 105 107   TAMIA 8.6 9.0 8.9 8.7   CO2 27 26 27 32   BUN 10 7 10 8   CR 0.68 0.68 0.69 0.85   * 92 103* 99     CBC  Recent Labs   Lab 06/10/19  0715 06/08/19  1433 06/04/19  1137   WBC 10.3 5.4 8.8   RBC 4.35 4.79 4.75   HGB 12.8 14.6 14.4   HCT 38.5 42.6 42.4   MCV 89 89 89   MCH 29.4 30.5 30.3   MCHC 33.2 34.3 34.0   RDW 12.9 12.7 12.0    254 281     INR  Recent Labs   Lab 06/09/19  0957 06/04/19  1137   INR 0.96 0.94     LFTs  Recent Labs   Lab 06/10/19  0715 06/09/19  0957 06/08/19  1433 06/04/19  1137   ALKPHOS 277* 328* 319* 98   AST 27 75* 88* 82*   * 253* 329* 31   BILITOTAL 1.6* 6.2* 5.6* 0.4   PROTTOTAL 6.2* 6.6* 6.6* 6.8   ALBUMIN 3.0* 3.3* 3.3* 3.7      PANC  Recent Labs   Lab 06/08/19  1433 06/04/19  1137   LIPASE 156 213         IMAGING:  reviewed

## 2019-06-10 NOTE — PLAN OF CARE
S/P ERCP with stents last evening.    AVSS.  97% RA.  Capno WNL.  Pt with c/o HA at start of shift, tylenol and hot packs with relief.  Pt sleeping well after.  Up with SBA to BR to void, good vols.  MIV@100/hr.    Old lap sites with dermabond, c/d/intact.  +BS, +flatus, BM yesterday.  Denies nausea, tolerating diet - scop patch ON.  Cont with POC.  Plan for discharge today.

## 2019-06-10 NOTE — DISCHARGE SUMMARY
[facility], [place]  Hospitalist Discharge Summary       Date of Admission:  6/8/2019  Date of Discharge:  6/10/2019  Discharging Provider: Kari Almeida PA-C/Dr. Dewitt  Discharge Team: Hospitalist Service, Gold 4    Discharge Diagnoses   RUQ pain, transaminitis s/p cholecystectomy    Follow-ups Needed After Discharge   - Follow up with PCP within 1 week and after GI follow up. Repeat LFTs in 1 week   - Follow up with GI for ERCP in 8 weeks     Unresulted Labs Ordered in the Past 30 Days of this Admission     Date and Time Order Name Status Description    6/9/2019 1534 EBV DNA PCR Quantitative Whole Blood In process           Discharge Disposition   Discharged to home  Condition at discharge: Stable    Hospital Course   Stephania Barahona is a 39 year old woman with a history of suspected acute cholecystitis s/p laparoscopic cholecystectomy on 6/4 who was admitted on 6/8 w/ RUQ pain, hyperbilirubinemia and elevated alk phos/transaminases concerning for retained CBD stone.       RUQ Pain, transaminitis s/p cholecystectomy 6/4: Lap jersey 6/4 distended thickened gallbladder w/ hydrops and stones. Discharged, then developed RUQ pain. CT AP mild to moderate intrahepatic and extrahepatic bililary ductal dilation with narrow of extrahepatic biliar duct near duodenal ampulla, concerning for retained CBD stone. S/p ERCP with stenting 6/10 with symptomatic relief, however, no stone noted. Hepatitis A, B, C, HIV testing negative. Tbili 1.6 (6.2),  (328), AST 27 (75),  (253).   - Follow up with GI in 8 weeks for repeat ERCP  - No NASIDS x72 hours from ERCP   - Please follow final EBV results   - Ok to continue PTA percocet prn       Consultations This Hospital Stay   GI LUMINAL ADULT IP CONSULT    Code Status   Full Code    Time Spent on this Encounter   I, Kari Almeida, personally saw the patient today and spent greater than 30 minutes discharging this patient.        Kari Almeida PA-C  [facility], [place]  ______________________________________________________________________    Physical Exam   Vital Signs: Temp: 98.1  F (36.7  C) Temp src: Oral BP: 103/52 Pulse: 79 Heart Rate: 64 Resp: 15 SpO2: 97 % O2 Device: None (Room air) Oxygen Delivery: 2 LPM  Weight: 0 lbs 0 oz  General Appearance: Alert and oriented x3, appears comfortable  Respiratory: CTAB without wheezing or rales  Cardiovascular: RRR, S1, S2. No murmur noted  GI: Abdomen soft, with mild RUQ tenderness. No guarding or rebound  Skin: No jaundice noted  Other: No peripheral edema, distal pulses palpable        Primary Care Physician   Britney Rodriguez    Discharge Orders   No discharge procedures on file.    Significant Results and Procedures   Most Recent 3 CBC's:  Recent Labs   Lab Test 06/10/19  0715 06/08/19  1433 06/04/19  1137   WBC 10.3 5.4 8.8   HGB 12.8 14.6 14.4   MCV 89 89 89    254 281     Most Recent 3 BMP's:  Recent Labs   Lab Test 06/10/19  0715 06/09/19  0957 06/08/19  1433    136 140   POTASSIUM 3.7 3.9 3.8   CHLORIDE 106 104 105   CO2 27 26 27   BUN 10 7 10   CR 0.68 0.68 0.69   ANIONGAP 7 6 8   TAMIA 8.6 9.0 8.9   * 92 103*     Most Recent 2 LFT's:  Recent Labs   Lab Test 06/10/19  0715 06/09/19  0957   AST 27 75*   * 253*   ALKPHOS 277* 328*   BILITOTAL 1.6* 6.2*       Discharge Medications   Current Discharge Medication List      CONTINUE these medications which have NOT CHANGED    Details   cyclobenzaprine (FLEXERIL) 10 MG tablet TAKE 1/2 - 1 TABLET EVERY NIGHT AT BEDTIME  Qty: 30 tablet, Refills: 2    Associated Diagnoses: Degeneration of lumbar or lumbosacral intervertebral disc      ibuprofen (ADVIL/MOTRIN) 200 MG tablet Take 800 mg by mouth every 4 hours as needed for mild pain      ondansetron (ZOFRAN-ODT) 4 MG ODT tab Take 1-2 tablets (4-8 mg) by mouth every 8 hours as needed for nausea  Qty: 10 tablet, Refills: 3    Associated  Diagnoses: Postoperative nausea      oxyCODONE-acetaminophen (PERCOCET) 5-325 MG tablet Take 1-2 tablets by mouth every 6 hours as needed for moderate to severe pain  Qty: 20 tablet, Refills: 0    Associated Diagnoses: Post-operative pain         STOP taking these medications       HYDROcodone-acetaminophen (NORCO) 5-325 MG tablet Comments:   Reason for Stopping:             Allergies   Allergies   Allergen Reactions     Clindamycin/Lincomycin Nausea     Nubain [Nalbuphine Hcl] Nausea and Vomiting     And dizziness

## 2019-06-10 NOTE — PROGRESS NOTES
Received message to organize the following: Hello,     Could you please assist in scheduling:     Procedure: ERCP   Physician: Zeke   Timin weeks   Dx: ampullary stenosis     Comments:   OR, general. Discharging from hospital today.     Thanks,   Patricia Massey PA-C     Order placed.     GARY Marquez Dr., Dr. Alanis, & Dr. Alvarez  [facility]  [phone]

## 2019-06-11 ENCOUNTER — PATIENT OUTREACH (OUTPATIENT)
Dept: CARE COORDINATION | Facility: CLINIC | Age: 39
End: 2019-06-11

## 2019-06-12 LAB
EBV DNA # SPEC NAA+PROBE: <500 {COPIES}/ML
EBV DNA SPEC NAA+PROBE-LOG#: <2.7 {LOG_COPIES}/ML

## 2019-06-17 ENCOUNTER — HOSPITAL ENCOUNTER (OUTPATIENT)
Facility: CLINIC | Age: 39
End: 2019-06-17
Attending: INTERNAL MEDICINE | Admitting: INTERNAL MEDICINE
Payer: MEDICAID

## 2019-06-18 ENCOUNTER — TELEPHONE (OUTPATIENT)
Dept: GASTROENTEROLOGY | Facility: CLINIC | Age: 39
End: 2019-06-18

## 2019-06-18 NOTE — TELEPHONE ENCOUNTER
LVM for patient in regards to scheduled procedure with Dr. Alanis. Left direct line for patient to call to go over scheduling details.     6/18/19 925am

## 2019-06-19 ENCOUNTER — TELEPHONE (OUTPATIENT)
Dept: GASTROENTEROLOGY | Facility: CLINIC | Age: 39
End: 2019-06-19

## 2019-06-19 NOTE — TELEPHONE ENCOUNTER
Attempted to reach patient again in regards to scheduled procedure with Dr. Alanis. Unable to leave voicemail now because mailbox is full.     6/19/19 057am

## 2019-06-20 ENCOUNTER — TELEPHONE (OUTPATIENT)
Dept: GASTROENTEROLOGY | Facility: CLINIC | Age: 39
End: 2019-06-20

## 2019-06-20 NOTE — TELEPHONE ENCOUNTER
LVM for patient in regards to scheduled procedure with Dr. Alanis. Left direct line for patient to call to go over scheduling details.     6/20/19 1034am

## 2019-06-26 ENCOUNTER — TELEPHONE (OUTPATIENT)
Dept: GASTROENTEROLOGY | Facility: CLINIC | Age: 39
End: 2019-06-26

## 2019-06-26 NOTE — TELEPHONE ENCOUNTER
Attempted to reach patient to go over scheduling details but went straight to voicemail and mailbox is full.     Letter sent and advised to call by June 2nd otherwise we will cancel procedure.     GARY Marquez Dr., Dr. Alanis, & Dr. Alvarez  [facility]  [phone]

## 2019-07-03 ENCOUNTER — TELEPHONE (OUTPATIENT)
Dept: GASTROENTEROLOGY | Facility: CLINIC | Age: 39
End: 2019-07-03

## 2019-07-03 NOTE — TELEPHONE ENCOUNTER
Message   Received: Today   Message Contents   Silvia Dorsey, Kary Irizarry,     Please cancel procedure.     Thanks,   Silvia            Canceled patients procedure due to message received above.     7/3/19 1104am

## 2019-09-26 ENCOUNTER — TELEPHONE (OUTPATIENT)
Dept: GASTROENTEROLOGY | Facility: CLINIC | Age: 39
End: 2019-09-26

## 2019-09-26 NOTE — TELEPHONE ENCOUNTER
LVM for patient in regards to scheduling procedure with Dr. Alanis. Left direct line for patient to call to go over scheduling details.     9/26/19 1211pm

## 2019-10-01 ENCOUNTER — TELEPHONE (OUTPATIENT)
Dept: GASTROENTEROLOGY | Facility: CLINIC | Age: 39
End: 2019-10-01

## 2019-10-01 NOTE — TELEPHONE ENCOUNTER
LVM for patient in regards to rescheduling procedure with Dr. Alanis. Left direct line for patient to call to go over scheduling details.     10/1/19

## 2019-10-31 NOTE — PROGRESS NOTES
Letter sent to patient informing of results.  ................Chase Cool LPN,   October 12, 2017,      12:00 PM,   [facility]   No abnormalities noted

## 2020-06-11 DIAGNOSIS — M51.379 DEGENERATION OF LUMBAR OR LUMBOSACRAL INTERVERTEBRAL DISC: ICD-10-CM

## 2020-06-11 RX ORDER — CYCLOBENZAPRINE HCL 10 MG
TABLET ORAL
Qty: 30 TABLET | Refills: 2 | OUTPATIENT
Start: 2020-06-11

## 2020-06-11 NOTE — TELEPHONE ENCOUNTER
LMTCB- When patient returns call please relay message from provider and assist with setting up an appointment. Jodi Thayer LPN........6/11/2020 10:19 AM

## 2020-06-11 NOTE — TELEPHONE ENCOUNTER
Reason for call:  Medication   If this is a refill request, has the caller requested the refill from the pharmacy already? Yes  Will the patient be using a [facility]? No  Name of the pharmacy and phone number for the current request: [facility] in [place], [phone]    Name of the medication requested:  cyclobenzaprine (FLEXERIL) 10 MG tablet    Other request: N/A  Phone number to reach patient:  Other phone number:  [phone]    Best Time:  Any  Can we leave a detailed message on this number?  YES    Travel screening: Not Applicable

## 2020-06-11 NOTE — TELEPHONE ENCOUNTER
Flexeril      Last Written Prescription Date:  7-12-18  Last Fill Quantity: 30,   # refills: 2  Last Office Visit: 7-12-18  Future Office visit:       Routing refill request to provider for review/approval because:  Drug not on the [facility], [facility] or [facility] refill protocol or controlled substance

## 2020-06-12 ENCOUNTER — TELEPHONE (OUTPATIENT)
Dept: FAMILY MEDICINE | Facility: OTHER | Age: 40
End: 2020-06-12

## 2020-06-12 ENCOUNTER — VIRTUAL VISIT (OUTPATIENT)
Dept: FAMILY MEDICINE | Facility: CLINIC | Age: 40
End: 2020-06-12
Payer: COMMERCIAL

## 2020-06-12 DIAGNOSIS — S16.1XXA STRAIN OF NECK MUSCLE, INITIAL ENCOUNTER: Primary | ICD-10-CM

## 2020-06-12 DIAGNOSIS — M51.379 DEGENERATION OF LUMBAR OR LUMBOSACRAL INTERVERTEBRAL DISC: ICD-10-CM

## 2020-06-12 PROCEDURE — 99213 OFFICE O/P EST LOW 20 MIN: CPT | Mod: 95 | Performed by: INTERNAL MEDICINE

## 2020-06-12 RX ORDER — CYCLOBENZAPRINE HCL 10 MG
TABLET ORAL
Qty: 30 TABLET | Refills: 2 | Status: SHIPPED | OUTPATIENT
Start: 2020-06-12 | End: 2021-08-03

## 2020-06-12 NOTE — PROGRESS NOTES
"Stephania Barahona is a 40 year old female who is being evaluated via a billable telephone visit.      The patient has been notified of following:     \"This telephone visit will be conducted via a call between you and your physician/provider. We have found that certain health care needs can be provided without the need for a physical exam.  This service lets us provide the care you need with a short phone conversation.  If a prescription is necessary we can send it directly to your pharmacy.  If lab work is needed we can place an order for that and you can then stop by our lab to have the test done at a later time.    Telephone visits are billed at different rates depending on your insurance coverage. During this emergency period, for some insurers they may be billed the same as an in-person visit.  Please reach out to your insurance provider with any questions.    If during the course of the call the physician/provider feels a telephone visit is not appropriate, you will not be charged for this service.\"    Patient has given verbal consent for Telephone visit?  Yes    What phone number would you like to be contacted at? [phone]    How would you like to obtain your AVS? Mail a copy    Subjective     Stephania Barahona is a 40 year old female who presents via phone visit today for the following health issues:    HPI  Medication follow up:    Cyclobenzaprine. Patient is requesting a refill. Patient is not having any issues with this medication. Patient is requesting this to be done with refills to last her throughout the year.                       Chief Complaint         The patient is a 40-year-old female who recently experienced some neck strain.  She had no fall or injury but notes that she has had this in the past.  She notes that in the past a short course of cyclobenzaprine has been helpful.  She is currently not reporting any opioid usage.  She notes that her neck and upper shoulder strain is intermittent and " occasional.  She is using moist heat and this does seem to helping modestly.                         PAST, FAMILY,SOCIAL HISTORY:     Medical  History:   has a past medical history of Motion sickness and NO ACTIVE PROBLEMS.     Surgical History:   has a past surgical history that includes tonsillectomy (1991); NONSPECIFIC PROCEDURE (2007); d & c (03/25/11); Laparoscopic cholecystectomy (N/A, 6/4/2019); and Endoscopic retrograde cholangiopancreatogram (N/A, 6/9/2019).     Social History:   reports that she has been smoking. She has never used smokeless tobacco. She reports that she does not drink alcohol or use drugs.     Family History:  family history includes Alcohol/Drug in her maternal grandfather; Alzheimer Disease in her paternal grandfather; Asthma in her brother, sister, and sister; Depression in her maternal grandfather; Diabetes in her sister; Psychotic Disorder in her maternal grandfather.            MEDICATIONS  Current Outpatient Medications   Medication Sig Dispense Refill     cyclobenzaprine (FLEXERIL) 10 MG tablet TAKE 1/2 - 1 TABLET EVERY NIGHT AT BEDTIME 30 tablet 2     ondansetron (ZOFRAN-ODT) 4 MG ODT tab Take 1-2 tablets (4-8 mg) by mouth every 8 hours as needed for nausea (Patient not taking: Reported on 6/12/2020) 10 tablet 3     oxyCODONE-acetaminophen (PERCOCET) 5-325 MG tablet Take 1-2 tablets by mouth every 6 hours as needed for moderate to severe pain (Patient not taking: Reported on 6/12/2020) 20 tablet 0         --------------------------------------------------------------------------------------------------------------------                              Review of Systems       LUNGS: Pt denies: cough, excess sputum, hemoptysis, or shortness of breath.   HEART: Pt denies: chest pain, arrhythmia, syncope, tachy or bradyarrhythmia.   GI: Pt denies: nausea, vomiting, diarrhea, constipation, melena, or hematochezia.   NEURO: Pt denies: seizures, strokes, diplopia, weakness, paraesthesias,  or paralysis.   SKIN: Pt denies: itching, rashes, discoloration, or specific lesions of concern. Denies recent hair loss.   PSYCH: The patient denies significant depression, anxiety, mood imbalance. Specifically denies any suicidal ideation.             MS: Pt denies: significant joint swelling, or acute loss of function. Able to ambulate with little or no assistance.  Has a decrease of her range of motion with minimal radicular findings at this time.  Also has chronic low back pain secondary to degenerative lumbar disc disease.  No radicular findings are noted.  She is able to ambulate without difficulty.  Heel and toe walking reportedly intact.                          No physical examination is performed as this is a virtual visit.  The patient's voice is strong, there is no evidence of labored breathing or wheezing.  The patient is alert and appropriate and demonstrates no obvious behavioral irregularities.         Decision Making       1. Strain of neck muscle, initial encounter  Moist heat, gentle range of motion exercises, short course of cyclobenzaprine  - cyclobenzaprine (FLEXERIL) 10 MG tablet; TAKE 1/2 - 1 TABLET EVERY NIGHT AT BEDTIME  Dispense: 30 tablet; Refill: 2    2. Degeneration of lumbar or lumbosacral intervertebral disc  As above  - cyclobenzaprine (FLEXERIL) 10 MG tablet; TAKE 1/2 - 1 TABLET EVERY NIGHT AT BEDTIME  Dispense: 30 tablet; Refill: 2                                FOLLOW UP   I have asked the patient to make an appointment for followup with me as needed and her primary care provider as scheduled.                I have carefully explained the diagnosis and treatment options to the patient.  The patient has displayed an understanding of the above, and all subsequent questions were answered.      DO DARRICK Roberto    Portions of this note were produced using [employer]  Although every attempt at real-time proof reading has been made, occasional grammar/syntax errors  may have been missed.      Telephone time: 18 minutes

## 2020-06-12 NOTE — TELEPHONE ENCOUNTER
Confirmed appt. See notes on schedule. Must have called back.   Emelyn Thayer on 6/12/2020 at 1:22 PM

## 2020-06-12 NOTE — TELEPHONE ENCOUNTER
Patient failed the 11 AM appt and Dr. Mora has allowed her to reschedule to 1:40 PM. Patient did not answer phone to receive this message and the VM is still full. I was unable to leave a message.  Emelyn Thayer on 6/12/2020 at 12:22 PM

## 2021-07-31 DIAGNOSIS — S16.1XXA STRAIN OF NECK MUSCLE, INITIAL ENCOUNTER: ICD-10-CM

## 2021-07-31 DIAGNOSIS — M51.379 DEGENERATION OF LUMBAR OR LUMBOSACRAL INTERVERTEBRAL DISC: ICD-10-CM

## 2021-08-03 RX ORDER — CYCLOBENZAPRINE HCL 10 MG
TABLET ORAL
Qty: 30 TABLET | Refills: 0 | Status: SHIPPED | OUTPATIENT
Start: 2021-08-03 | End: 2021-10-18

## 2021-08-30 ENCOUNTER — VIRTUAL VISIT (OUTPATIENT)
Dept: FAMILY MEDICINE | Facility: CLINIC | Age: 41
End: 2021-08-30
Payer: COMMERCIAL

## 2021-08-30 DIAGNOSIS — R06.2 WHEEZING: Primary | ICD-10-CM

## 2021-08-30 PROCEDURE — 99213 OFFICE O/P EST LOW 20 MIN: CPT | Mod: 95 | Performed by: PHYSICIAN ASSISTANT

## 2021-08-30 RX ORDER — INHALER, ASSIST DEVICES
SPACER (EA) MISCELLANEOUS
Qty: 1 EACH | Refills: 0 | Status: SHIPPED | OUTPATIENT
Start: 2021-08-30 | End: 2021-10-18

## 2021-08-30 RX ORDER — ALBUTEROL SULFATE 90 UG/1
2 AEROSOL, METERED RESPIRATORY (INHALATION) EVERY 4 HOURS PRN
Qty: 18 G | Refills: 0 | Status: SHIPPED | OUTPATIENT
Start: 2021-08-30

## 2021-08-30 NOTE — PATIENT INSTRUCTIONS
Albuterol inhaler, 2 puffs every 4 hours as needed for wheezing  See primary care provider if using more than 4 times daily for more than 3 weeks or if waking at night to use inhaler.

## 2021-08-30 NOTE — PROGRESS NOTES
Stephania is a 41 year old who is being evaluated via a billable telephone visit.      What phone number would you like to be contacted at? [phone]  How would you like to obtain your AVS? Mail a copy    Assessment & Plan     Wheezing  - albuterol (PROAIR HFA/PROVENTIL HFA/VENTOLIN HFA) 108 (90 Base) MCG/ACT inhaler; Inhale 2 puffs into the lungs every 4 hours as needed for shortness of breath / dyspnea or wheezing  - spacer (OPTICHAMBER OLE) holding chamber; For use with albuterol inhaler    We will refill inhaler x1 given that she has had this in the past and has used the same inhaler for the last 6 years.  We discussed that if the frequency of her inhaler use increases and she goes through an inhaler faster than before, follow-up in clinic for further evaluation is a good idea.  May need to start on maintenance asthma medication if using the inhaler more than every 4 hours for an extended period of time.    15 minutes spent on the date of the encounter doing chart review, patient visit and documentation     No follow-ups on file.    Princess De La Paz PA-C  [facility]   Stephania is a 41 year old who presents for the following health issues     HPI     Concern - need a refill for her inhaler  Onset: heavy with breathing 5-6 weeks  Description: heavy breathing, mostly in the mornings  Intensity: mild  Progression of Symptoms:  constant  Accompanying Signs & Symptoms: wheezing  Previous history of similar problem: yes  Precipitating factors:        Worsened by: when using stairs or cleaning/vacuuming  Alleviating factors:        Improved by: none  Therapies tried and outcome: tries to cough to clear her lungs, taking deep breaths and hot showers    Stephania presents via telephone for evaluation of wheezing.  She states that for the last 5 to 6 weeks or so her chest has felt tight and wheezy and she feels that she has a hard time pulling in a deep breath.  This is worse with activity  such as going up and down a flight of stairs.  She does note that it is occasionally persistent when she sits however after sitting for short period of time this has resolved.  She notes that she was previously diagnosed with asthma and was given an albuterol inhaler.  Her last inhaler was last prescribed about 6 years ago and has now run out.  She notes that she needs it this time of year every year and sometimes in the winter when she gets a cold.      Review of Systems   ROS negative except as stated above.        Objective           Vitals:  No vitals were obtained today due to virtual visit.    Physical Exam   healthy, alert and no distress  PSYCH: Alert and oriented times 3; coherent speech, normal   rate and volume, able to articulate logical thoughts, able   to abstract reason, no tangential thoughts, no hallucinations   or delusions  Her affect is normal and pleasant  RESP: No cough, no audible wheezing, able to talk in full sentences  Remainder of exam unable to be completed due to telephone visits    No results found for any visits on 08/30/21.        Phone call duration: 8 minutes

## 2021-10-18 ENCOUNTER — VIRTUAL VISIT (OUTPATIENT)
Dept: FAMILY MEDICINE | Facility: CLINIC | Age: 41
End: 2021-10-18
Payer: COMMERCIAL

## 2021-10-18 DIAGNOSIS — R06.2 WHEEZING: ICD-10-CM

## 2021-10-18 DIAGNOSIS — M51.379 DEGENERATION OF LUMBAR OR LUMBOSACRAL INTERVERTEBRAL DISC: ICD-10-CM

## 2021-10-18 DIAGNOSIS — K08.89 PAIN, DENTAL: Primary | ICD-10-CM

## 2021-10-18 PROCEDURE — 99213 OFFICE O/P EST LOW 20 MIN: CPT | Mod: 95 | Performed by: FAMILY MEDICINE

## 2021-10-18 RX ORDER — INHALER, ASSIST DEVICES
SPACER (EA) MISCELLANEOUS
Qty: 1 EACH | Refills: 0 | Status: SHIPPED | OUTPATIENT
Start: 2021-10-18

## 2021-10-18 RX ORDER — CYCLOBENZAPRINE HCL 10 MG
TABLET ORAL
Qty: 30 TABLET | Refills: 0 | Status: SHIPPED | OUTPATIENT
Start: 2021-10-18 | End: 2022-08-17

## 2021-10-18 RX ORDER — IBUPROFEN 800 MG/1
800 TABLET, FILM COATED ORAL EVERY 8 HOURS PRN
Qty: 30 TABLET | Refills: 1 | Status: SHIPPED | OUTPATIENT
Start: 2021-10-18

## 2021-10-18 NOTE — PROGRESS NOTES
Stephania is a 41 year old who is being evaluated via a billable telephone visit.      What phone number would you like to be contacted at? [phone]  How would you like to obtain your AVS? Mail a copy    Assessment & Plan     Pain, dental  From description it is hard to tell whether she is got some tooth decay or possibly sinus or ear infection on the right side.  Try her on an antibiotic.  - amoxicillin-clavulanate (AUGMENTIN) 875-125 MG tablet; Take 1 tablet by mouth 2 times daily for 10 days.  We will have her take ibuprofen 800 up to 3 times a day.  - ibuprofen (ADVIL/MOTRIN) 800 MG tablet; Take 1 tablet (800 mg) by mouth every 8 hours as needed for moderate pain    Wheezing  She uses her albuterol as needed.  Lost her chamber.  - spacer (OPTICHAMBER OLE) holding chamber; For use with albuterol inhaler    Degeneration of lumbar or lumbosacral intervertebral disc  Renewed her Flexeril she uses at night for chronic lumbar disc pain and helps her sleep.  We also gave ibuprofen for the dental pain above and this should help.  2.  - cyclobenzaprine (FLEXERIL) 10 MG tablet; TAKE 1/2 TO 1 (ONE-HALF TO ONE) TABLET BY MOUTH ONCE DAILY AT NIGHT AT BEDTIME             Tobacco Cessation:   reports that she has been smoking. She has never used smokeless tobacco.          No follow-ups on file.    Guillermo Negrete MD  [facility]   Stephania is a 41 year old who presents for the following health issues : Right-sided dental pain and thinks she may have some infection but also has ear pain on the right so is not sure if it is coming from there or sinus as well.  No fever.  No cough.  No real headaches.  Also needs a new AeroChamber for her Ventolin inhaler.  She wants a refill on her Flexeril she takes at night to help her back settle down and it makes her tired.    HPI     Pain History:  When did you first notice your pain? - Less than 1 week first with teeth then with ear  Have you seen  anyone else for your pain? No  Where in your body do you have pain? teeth and right ear        Review of Systems   Constitutional, HEENT, cardiovascular, pulmonary, gi and gu systems are negative, except as otherwise noted.      Objective           Vitals:  No vitals were obtained today due to virtual visit.    Physical Exam   healthy, alert and no distress  PSYCH: Alert and oriented times 3; coherent speech, normal   rate and volume, able to articulate logical thoughts, able   to abstract reason, no tangential thoughts, no hallucinations   or delusions  Her affect is normal and pleasant  RESP: No cough, no audible wheezing, able to talk in full sentences  Remainder of exam unable to be completed due to telephone visits                Phone call duration: 5 minutes

## 2021-10-23 ENCOUNTER — HEALTH MAINTENANCE LETTER (OUTPATIENT)
Age: 41
End: 2021-10-23

## 2022-08-16 DIAGNOSIS — M51.379 DEGENERATION OF LUMBAR OR LUMBOSACRAL INTERVERTEBRAL DISC: ICD-10-CM

## 2022-08-17 RX ORDER — CYCLOBENZAPRINE HCL 10 MG
TABLET ORAL
Qty: 30 TABLET | Refills: 0 | Status: SHIPPED | OUTPATIENT
Start: 2022-08-17

## 2022-08-17 NOTE — TELEPHONE ENCOUNTER
Routing refill request to provider for review/approval because:    Requested Prescriptions   Pending Prescriptions Disp Refills    cyclobenzaprine (FLEXERIL) 10 MG tablet [Pharmacy Med Name: Cyclobenzaprine HCl 10 MG Oral Tablet] 30 tablet 0     Sig: TAKE 1/2 TO 1 (ONE-HALF TO ONE) TABLET BY MOUTH ONCE DAILY AT NIGHT AT BEDTIME        There is no refill protocol information for this order           cyclobenzaprine (FLEXERIL) 10 MG tablet 30 tablet 0 10/18/2021  No   Sig: TAKE 1/2 TO 1 (ONE-HALF TO ONE) TABLET BY MOUTH ONCE DAILY AT NIGHT AT BEDTIME   Sent to pharmacy as: Cyclobenzaprine HCl 10 MG Oral Tablet (FLEXERIL)   Class: E-Prescribe   Order: [id number]   E-Prescribing Status: Receipt confirmed by pharmacy (10/18/2021  1:58 PM CDT)

## 2022-10-07 ENCOUNTER — VIRTUAL VISIT (OUTPATIENT)
Dept: PEDIATRICS | Facility: CLINIC | Age: 42
End: 2022-10-07
Payer: COMMERCIAL

## 2022-10-07 DIAGNOSIS — K08.89 TOOTHACHE: Primary | ICD-10-CM

## 2022-10-07 PROCEDURE — 99213 OFFICE O/P EST LOW 20 MIN: CPT | Mod: 95 | Performed by: INTERNAL MEDICINE

## 2022-10-07 RX ORDER — IBUPROFEN 800 MG/1
800 TABLET, FILM COATED ORAL EVERY 8 HOURS PRN
Qty: 42 TABLET | Refills: 0 | Status: SHIPPED | OUTPATIENT
Start: 2022-10-07 | End: 2022-10-21

## 2022-10-07 NOTE — PROGRESS NOTES
Stephania is a 42 year old who is being evaluated via a billable telephone visit.          Assessment & Plan     Toothache  Pt with tooth pain - unable to get appt with dentist for 2 weeks.  She suspects this is an abscess and requests antibiotics.  I explained that since I am unable to evaluate this, it is best if she go back to dentist for antibiotics if deemed necessary/appropriate.  - ibuprofen (ADVIL/MOTRIN) 800 MG tablet; Take 1 tablet (800 mg) by mouth every 8 hours as needed for moderate pain       Nicotine/Tobacco Cessation:  She reports that she has been smoking. She has never used smokeless tobacco.  Nicotine/Tobacco Cessation Plan:             No follow-ups on file.    Simone Mclain MD  [facility]    Saritha Cat is a 42 year old, presenting for the following health issues:  No chief complaint on file.      HPI     Tooth ache.  Dentist appt in 2 weeks.    [facility] of [facility]   [facility]        Objective           Vitals:  No vitals were obtained today due to virtual visit.    Physical Exam   PSYCH: Alert and oriented times 3; coherent speech, normal   rate and volume, able to articulate logical thoughts, able   to abstract reason, no tangential thoughts, no hallucinations   or delusions  Her affect is normal  RESP: No cough, no audible wheezing, able to talk in full sentences  Remainder of exam unable to be completed due to telephone visits                Phone call duration: 9 minutes

## 2022-10-09 ENCOUNTER — HEALTH MAINTENANCE LETTER (OUTPATIENT)
Age: 42
End: 2022-10-09

## 2022-11-26 ENCOUNTER — HEALTH MAINTENANCE LETTER (OUTPATIENT)
Age: 42
End: 2022-11-26

## 2023-09-23 DIAGNOSIS — M51.379 DEGENERATION OF LUMBAR OR LUMBOSACRAL INTERVERTEBRAL DISC: ICD-10-CM

## 2023-09-25 RX ORDER — CYCLOBENZAPRINE HCL 10 MG
TABLET ORAL
Qty: 30 TABLET | Refills: 0 | OUTPATIENT
Start: 2023-09-25

## 2024-01-06 ENCOUNTER — HEALTH MAINTENANCE LETTER (OUTPATIENT)
Age: 44
End: 2024-01-06

## 2024-03-16 ENCOUNTER — HEALTH MAINTENANCE LETTER (OUTPATIENT)
Age: 44
End: 2024-03-16

## 2025-01-21 NOTE — CONFIDENTIAL NOTE
Requested Prescriptions   Pending Prescriptions Disp Refills     cyclobenzaprine (FLEXERIL) 10 MG tablet [Pharmacy Med Name: Cyclobenzaprine HCl 10 MG Oral Tablet] 30 tablet 0     Sig: TAKE 1/2 TO 1 (ONE-HALF TO ONE) TABLET BY MOUTH ONCE DAILY AT NIGHT AT BEDTIME     Last Written Prescription Date:  06/12/2020  Last Fill Quantity: 30,   # refills: 2  Last Office Visit: 06/12/2020  Future Office visit:       Routing refill request to provider for review/approval because:  Drug not on the [facility], [facility] or [facility] refill protocol or controlled substance    
36.5

## 2025-01-25 ENCOUNTER — HEALTH MAINTENANCE LETTER (OUTPATIENT)
Age: 45
End: 2025-01-25

## (undated) DEVICE — ENDO TUBING CO2 SMARTCAP STERILE DISP 100145CO2EXT

## (undated) DEVICE — ENDO TROCAR FIRST ENTRY KII FIOS Z-THRD 05X100MM CTF03

## (undated) DEVICE — INTR ENDOSCOPIC STENT FUSION OASIS 09.0FRX200CM

## (undated) DEVICE — ENDO CLIP CARTIRDGE K2 MED/LG 10 1112

## (undated) DEVICE — GLOVE PROTEXIS W/NEU-THERA 7.5  2D73TE75

## (undated) DEVICE — SU PDS II 1 CT-1 MONOFIL Z347H

## (undated) DEVICE — ESU HOOK TIP 5MM CONMED

## (undated) DEVICE — ENDO DEVICE LOCKING AND BIOPSY CAP M00545261

## (undated) DEVICE — SU VICRYL 3-0 SH 27" UND J416H

## (undated) DEVICE — ESU SUCTION/IRRIGATION SYSTEM PISTOL GRIP

## (undated) DEVICE — SU MONOCRYL 4-0 PS-2 18" UND Y496G

## (undated) DEVICE — ESU GROUND PAD UNIVERSAL W/O CORD

## (undated) DEVICE — SOL WATER IRRIG 1000ML BOTTLE 2F7114

## (undated) DEVICE — PACK ENDOSCOPY GI CUSTOM UMMC

## (undated) DEVICE — PACK GENERAL LAPAOSCOPY

## (undated) DEVICE — ENDO POUCH UNIV RETRIEVAL SYSTEM INZII 10MM CD001

## (undated) DEVICE — ESU CORD MONOPOLAR HIGH FREQUENCY 26006M-D/10

## (undated) DEVICE — STOCKING SLEEVE COMPRESSION CALF LG

## (undated) DEVICE — SOL NACL 0.9% INJ 1000ML BAG 07983-09

## (undated) DEVICE — ENDO BITE BLOCK ADULT OMNI-BLOC

## (undated) DEVICE — ENDO TROCAR SHIELDED BLADED KII Z-THRD 05X100MM CTB03

## (undated) DEVICE — SYR 10ML PREFILLED 0.9% NACL INJ NOT STERILE 306500

## (undated) DEVICE — KIT ENDO FIRST STEP DISINFECTANT 200ML W/POUCH EP-4

## (undated) DEVICE — DEVICE SUTURE GRASPER TROCAR CLOSURE 14GA PMITCSG

## (undated) DEVICE — BIOPSY VALVE BIOSHIELD 00711135

## (undated) RX ORDER — PROPOFOL 10 MG/ML
INJECTION, EMULSION INTRAVENOUS
Status: DISPENSED
Start: 2019-06-04

## (undated) RX ORDER — GLYCOPYRROLATE 0.2 MG/ML
INJECTION INTRAMUSCULAR; INTRAVENOUS
Status: DISPENSED
Start: 2019-06-04

## (undated) RX ORDER — FENTANYL CITRATE 50 UG/ML
INJECTION, SOLUTION INTRAMUSCULAR; INTRAVENOUS
Status: DISPENSED
Start: 2019-06-04

## (undated) RX ORDER — FENTANYL CITRATE 50 UG/ML
INJECTION, SOLUTION INTRAMUSCULAR; INTRAVENOUS
Status: DISPENSED
Start: 2019-06-09

## (undated) RX ORDER — LIDOCAINE HYDROCHLORIDE 20 MG/ML
INJECTION, SOLUTION EPIDURAL; INFILTRATION; INTRACAUDAL; PERINEURAL
Status: DISPENSED
Start: 2019-06-04

## (undated) RX ORDER — SCOLOPAMINE TRANSDERMAL SYSTEM 1 MG/1
PATCH, EXTENDED RELEASE TRANSDERMAL
Status: DISPENSED
Start: 2019-06-09

## (undated) RX ORDER — CITRIC ACID/SODIUM CITRATE 334-500MG
SOLUTION, ORAL ORAL
Status: DISPENSED
Start: 2019-06-09

## (undated) RX ORDER — DEXAMETHASONE SODIUM PHOSPHATE 10 MG/ML
INJECTION, SOLUTION INTRAMUSCULAR; INTRAVENOUS
Status: DISPENSED
Start: 2019-06-04

## (undated) RX ORDER — BUPIVACAINE HYDROCHLORIDE AND EPINEPHRINE 2.5; 5 MG/ML; UG/ML
INJECTION, SOLUTION EPIDURAL; INFILTRATION; INTRACAUDAL; PERINEURAL
Status: DISPENSED
Start: 2019-06-04

## (undated) RX ORDER — ONDANSETRON 2 MG/ML
INJECTION INTRAMUSCULAR; INTRAVENOUS
Status: DISPENSED
Start: 2019-06-04

## (undated) RX ORDER — NEOSTIGMINE METHYLSULFATE 1 MG/ML
VIAL (ML) INJECTION
Status: DISPENSED
Start: 2019-06-04

## (undated) RX ORDER — KETOROLAC TROMETHAMINE 30 MG/ML
INJECTION, SOLUTION INTRAMUSCULAR; INTRAVENOUS
Status: DISPENSED
Start: 2019-06-04

## (undated) RX ORDER — SCOLOPAMINE TRANSDERMAL SYSTEM 1 MG/1
PATCH, EXTENDED RELEASE TRANSDERMAL
Status: DISPENSED
Start: 2019-06-04

## (undated) RX ORDER — SIMETHICONE 40MG/0.6ML
SUSPENSION, DROPS(FINAL DOSAGE FORM)(ML) ORAL
Status: DISPENSED
Start: 2019-06-09

## (undated) RX ORDER — INDOMETHACIN 50 MG/1
SUPPOSITORY RECTAL
Status: DISPENSED
Start: 2019-06-09

## (undated) RX ORDER — HYDROMORPHONE HYDROCHLORIDE 1 MG/ML
INJECTION, SOLUTION INTRAMUSCULAR; INTRAVENOUS; SUBCUTANEOUS
Status: DISPENSED
Start: 2019-06-04

## (undated) RX ORDER — DIMENHYDRINATE 50 MG/ML
INJECTION, SOLUTION INTRAMUSCULAR; INTRAVENOUS
Status: DISPENSED
Start: 2019-06-04

## (undated) RX ORDER — IOPAMIDOL 510 MG/ML
INJECTION, SOLUTION INTRAVASCULAR
Status: DISPENSED
Start: 2019-06-09

## (undated) RX ORDER — ONDANSETRON 2 MG/ML
INJECTION INTRAMUSCULAR; INTRAVENOUS
Status: DISPENSED
Start: 2019-06-09